# Patient Record
Sex: MALE | Race: BLACK OR AFRICAN AMERICAN | NOT HISPANIC OR LATINO | URBAN - METROPOLITAN AREA
[De-identification: names, ages, dates, MRNs, and addresses within clinical notes are randomized per-mention and may not be internally consistent; named-entity substitution may affect disease eponyms.]

---

## 2023-01-09 ENCOUNTER — EMERGENCY (EMERGENCY)
Facility: HOSPITAL | Age: 28
LOS: 0 days | Discharge: HOME | End: 2023-01-10
Attending: EMERGENCY MEDICINE
Payer: COMMERCIAL

## 2023-01-09 VITALS
TEMPERATURE: 99 F | SYSTOLIC BLOOD PRESSURE: 128 MMHG | RESPIRATION RATE: 18 BRPM | HEIGHT: 72 IN | DIASTOLIC BLOOD PRESSURE: 82 MMHG | HEART RATE: 81 BPM | WEIGHT: 285.06 LBS | OXYGEN SATURATION: 99 %

## 2023-01-09 DIAGNOSIS — S39.012A STRAIN OF MUSCLE, FASCIA AND TENDON OF LOWER BACK, INITIAL ENCOUNTER: ICD-10-CM
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
## 2023-01-09 DIAGNOSIS — M54.50 LOW BACK PAIN, UNSPECIFIED: ICD-10-CM
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
## 2023-01-09 DIAGNOSIS — Z88.8 ALLERGY STATUS TO OTHER DRUGS, MEDICAMENTS AND BIOLOGICAL SUBSTANCES STATUS: ICD-10-CM
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
## 2023-01-09 PROCEDURE — 99284 EMERGENCY DEPT VISIT MOD MDM: CPT

## 2023-01-09 NOTE — ED ADULT NURSE REASSESSMENT NOTE - NS ED NURSE REASSESS COMMENT FT1
pt reassessed, pain is still the same, "stiff back" headache. waiting to be seen by MD. vital signs taken and recorded.
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
## 2023-01-10 VITALS
HEART RATE: 63 BPM | SYSTOLIC BLOOD PRESSURE: 133 MMHG | TEMPERATURE: 99 F | RESPIRATION RATE: 17 BRPM | DIASTOLIC BLOOD PRESSURE: 83 MMHG | OXYGEN SATURATION: 99 %

## 2023-01-10 RX ORDER — DIAZEPAM 5 MG
10 TABLET ORAL ONCE
Refills: 0 | Status: DISCONTINUED | OUTPATIENT
Start: 2023-01-10 | End: 2023-01-10

## 2023-01-10 RX ORDER — IBUPROFEN 200 MG
1 TABLET ORAL
Qty: 15 | Refills: 0
Start: 2023-01-10 | End: 2023-01-14

## 2023-01-10 RX ORDER — TRAMADOL HYDROCHLORIDE 50 MG/1
50 TABLET ORAL ONCE
Refills: 0 | Status: DISCONTINUED | OUTPATIENT
Start: 2023-01-10 | End: 2023-01-10

## 2023-01-10 RX ORDER — KETOROLAC TROMETHAMINE 30 MG/ML
60 SYRINGE (ML) INJECTION ONCE
Refills: 0 | Status: DISCONTINUED | OUTPATIENT
Start: 2023-01-10 | End: 2023-01-10

## 2023-01-10 RX ORDER — TRAMADOL HYDROCHLORIDE 50 MG/1
1 TABLET ORAL
Qty: 15 | Refills: 0
Start: 2023-01-10 | End: 2023-01-14

## 2023-01-10 RX ORDER — DIAZEPAM 5 MG
1 TABLET ORAL
Qty: 12 | Refills: 0
Start: 2023-01-10 | End: 2023-01-13

## 2023-01-10 RX ADMIN — Medication 60 MILLIGRAM(S): at 02:41

## 2023-01-10 RX ADMIN — Medication 10 MILLIGRAM(S): at 02:41

## 2023-01-10 RX ADMIN — TRAMADOL HYDROCHLORIDE 50 MILLIGRAM(S): 50 TABLET ORAL at 02:40

## 2023-01-10 NOTE — ED PROVIDER NOTE - PATIENT PORTAL LINK FT
You can access the FollowMyHealth Patient Portal offered by [facility] by registering at the following website: [email or web address]. By joining [facility] [facility]’s FollowMyHealth portal, you will also be able to view your health information using other applications (apps) compatible with our system.
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
## 2023-06-19 ENCOUNTER — INPATIENT (INPATIENT)
Facility: HOSPITAL | Age: 28
LOS: 6 days | Discharge: HOME | End: 2023-06-26
Attending: INTERNAL MEDICINE | Admitting: INTERNAL MEDICINE
Payer: MEDICAID

## 2023-06-19 VITALS
SYSTOLIC BLOOD PRESSURE: 142 MMHG | HEART RATE: 151 BPM | OXYGEN SATURATION: 100 % | RESPIRATION RATE: 20 BRPM | DIASTOLIC BLOOD PRESSURE: 93 MMHG | TEMPERATURE: 98 F

## 2023-06-19 DIAGNOSIS — R21 RASH AND OTHER NONSPECIFIC SKIN ERUPTION: ICD-10-CM

## 2023-06-19 DIAGNOSIS — A60.00 HERPESVIRAL INFECTION OF UROGENITAL SYSTEM, UNSPECIFIED: ICD-10-CM

## 2023-06-19 DIAGNOSIS — N47.1 PHIMOSIS: ICD-10-CM

## 2023-06-19 DIAGNOSIS — Z29.9 ENCOUNTER FOR PROPHYLACTIC MEASURES, UNSPECIFIED: ICD-10-CM

## 2023-06-19 LAB
ALBUMIN SERPL ELPH-MCNC: 5.2 G/DL — HIGH (ref 3.3–5)
ALP SERPL-CCNC: 79 U/L — SIGNIFICANT CHANGE UP (ref 40–120)
ALT FLD-CCNC: 35 U/L — SIGNIFICANT CHANGE UP (ref 4–41)
ANION GAP SERPL CALC-SCNC: 14 MMOL/L — SIGNIFICANT CHANGE UP (ref 7–14)
APPEARANCE UR: CLEAR — SIGNIFICANT CHANGE UP
AST SERPL-CCNC: 38 U/L — SIGNIFICANT CHANGE UP (ref 4–40)
BACTERIA # UR AUTO: NEGATIVE — SIGNIFICANT CHANGE UP
BASOPHILS # BLD AUTO: 0.05 K/UL — SIGNIFICANT CHANGE UP (ref 0–0.2)
BASOPHILS NFR BLD AUTO: 0.8 % — SIGNIFICANT CHANGE UP (ref 0–2)
BILIRUB SERPL-MCNC: 0.6 MG/DL — SIGNIFICANT CHANGE UP (ref 0.2–1.2)
BILIRUB UR-MCNC: NEGATIVE — SIGNIFICANT CHANGE UP
BUN SERPL-MCNC: 7 MG/DL — SIGNIFICANT CHANGE UP (ref 7–23)
CALCIUM SERPL-MCNC: 9.8 MG/DL — SIGNIFICANT CHANGE UP (ref 8.4–10.5)
CHLORIDE SERPL-SCNC: 104 MMOL/L — SIGNIFICANT CHANGE UP (ref 98–107)
CO2 SERPL-SCNC: 22 MMOL/L — SIGNIFICANT CHANGE UP (ref 22–31)
COLOR SPEC: YELLOW — SIGNIFICANT CHANGE UP
CREAT SERPL-MCNC: 0.95 MG/DL — SIGNIFICANT CHANGE UP (ref 0.5–1.3)
DIFF PNL FLD: NEGATIVE — SIGNIFICANT CHANGE UP
EGFR: 113 ML/MIN/1.73M2 — SIGNIFICANT CHANGE UP
EOSINOPHIL # BLD AUTO: 0.15 K/UL — SIGNIFICANT CHANGE UP (ref 0–0.5)
EOSINOPHIL NFR BLD AUTO: 2.3 % — SIGNIFICANT CHANGE UP (ref 0–6)
EPI CELLS # UR: 1 /HPF — SIGNIFICANT CHANGE UP (ref 0–5)
GLUCOSE SERPL-MCNC: 106 MG/DL — HIGH (ref 70–99)
GLUCOSE UR QL: NEGATIVE — SIGNIFICANT CHANGE UP
HCT VFR BLD CALC: 49.3 % — SIGNIFICANT CHANGE UP (ref 39–50)
HGB BLD-MCNC: 16.6 G/DL — SIGNIFICANT CHANGE UP (ref 13–17)
HIV 1+2 AB+HIV1 P24 AG SERPL QL IA: SIGNIFICANT CHANGE UP
HYALINE CASTS # UR AUTO: 2 /LPF — SIGNIFICANT CHANGE UP (ref 0–7)
IANC: 3.97 K/UL — SIGNIFICANT CHANGE UP (ref 1.8–7.4)
IMM GRANULOCYTES NFR BLD AUTO: 0.3 % — SIGNIFICANT CHANGE UP (ref 0–0.9)
KETONES UR-MCNC: NEGATIVE — SIGNIFICANT CHANGE UP
LEUKOCYTE ESTERASE UR-ACNC: NEGATIVE — SIGNIFICANT CHANGE UP
LYMPHOCYTES # BLD AUTO: 1.89 K/UL — SIGNIFICANT CHANGE UP (ref 1–3.3)
LYMPHOCYTES # BLD AUTO: 29.1 % — SIGNIFICANT CHANGE UP (ref 13–44)
MCHC RBC-ENTMCNC: 31.3 PG — SIGNIFICANT CHANGE UP (ref 27–34)
MCHC RBC-ENTMCNC: 33.7 GM/DL — SIGNIFICANT CHANGE UP (ref 32–36)
MCV RBC AUTO: 92.8 FL — SIGNIFICANT CHANGE UP (ref 80–100)
MONOCYTES # BLD AUTO: 0.42 K/UL — SIGNIFICANT CHANGE UP (ref 0–0.9)
MONOCYTES NFR BLD AUTO: 6.5 % — SIGNIFICANT CHANGE UP (ref 2–14)
NEUTROPHILS # BLD AUTO: 3.97 K/UL — SIGNIFICANT CHANGE UP (ref 1.8–7.4)
NEUTROPHILS NFR BLD AUTO: 61 % — SIGNIFICANT CHANGE UP (ref 43–77)
NITRITE UR-MCNC: NEGATIVE — SIGNIFICANT CHANGE UP
NRBC # BLD: 0 /100 WBCS — SIGNIFICANT CHANGE UP (ref 0–0)
NRBC # FLD: 0 K/UL — SIGNIFICANT CHANGE UP (ref 0–0)
PH UR: 6 — SIGNIFICANT CHANGE UP (ref 5–8)
PLATELET # BLD AUTO: 286 K/UL — SIGNIFICANT CHANGE UP (ref 150–400)
POTASSIUM SERPL-MCNC: 3.8 MMOL/L — SIGNIFICANT CHANGE UP (ref 3.5–5.3)
POTASSIUM SERPL-SCNC: 3.8 MMOL/L — SIGNIFICANT CHANGE UP (ref 3.5–5.3)
PROT SERPL-MCNC: 9.1 G/DL — HIGH (ref 6–8.3)
PROT UR-MCNC: ABNORMAL
RBC # BLD: 5.31 M/UL — SIGNIFICANT CHANGE UP (ref 4.2–5.8)
RBC # FLD: 13.5 % — SIGNIFICANT CHANGE UP (ref 10.3–14.5)
RBC CASTS # UR COMP ASSIST: 3 /HPF — SIGNIFICANT CHANGE UP (ref 0–4)
SODIUM SERPL-SCNC: 140 MMOL/L — SIGNIFICANT CHANGE UP (ref 135–145)
SP GR SPEC: 1.03 — SIGNIFICANT CHANGE UP (ref 1.01–1.05)
UROBILINOGEN FLD QL: SIGNIFICANT CHANGE UP
WBC # BLD: 6.5 K/UL — SIGNIFICANT CHANGE UP (ref 3.8–10.5)
WBC # FLD AUTO: 6.5 K/UL — SIGNIFICANT CHANGE UP (ref 3.8–10.5)
WBC UR QL: 1 /HPF — SIGNIFICANT CHANGE UP (ref 0–5)

## 2023-06-19 PROCEDURE — 99285 EMERGENCY DEPT VISIT HI MDM: CPT

## 2023-06-19 PROCEDURE — 99254 IP/OBS CNSLTJ NEW/EST MOD 60: CPT

## 2023-06-19 PROCEDURE — 99223 1ST HOSP IP/OBS HIGH 75: CPT | Mod: GC

## 2023-06-19 PROCEDURE — 93010 ELECTROCARDIOGRAM REPORT: CPT

## 2023-06-19 RX ORDER — ONDANSETRON 8 MG/1
4 TABLET, FILM COATED ORAL EVERY 8 HOURS
Refills: 0 | Status: DISCONTINUED | OUTPATIENT
Start: 2023-06-19 | End: 2023-06-26

## 2023-06-19 RX ORDER — KETOROLAC TROMETHAMINE 30 MG/ML
15 SYRINGE (ML) INJECTION EVERY 6 HOURS
Refills: 0 | Status: DISCONTINUED | OUTPATIENT
Start: 2023-06-19 | End: 2023-06-20

## 2023-06-19 RX ORDER — HYDROMORPHONE HYDROCHLORIDE 2 MG/ML
1 INJECTION INTRAMUSCULAR; INTRAVENOUS; SUBCUTANEOUS ONCE
Refills: 0 | Status: DISCONTINUED | OUTPATIENT
Start: 2023-06-19 | End: 2023-06-19

## 2023-06-19 RX ORDER — HYDROMORPHONE HYDROCHLORIDE 2 MG/ML
1 INJECTION INTRAMUSCULAR; INTRAVENOUS; SUBCUTANEOUS EVERY 6 HOURS
Refills: 0 | Status: DISCONTINUED | OUTPATIENT
Start: 2023-06-19 | End: 2023-06-19

## 2023-06-19 RX ORDER — ACETAMINOPHEN 500 MG
650 TABLET ORAL EVERY 6 HOURS
Refills: 0 | Status: DISCONTINUED | OUTPATIENT
Start: 2023-06-19 | End: 2023-06-26

## 2023-06-19 RX ORDER — LANOLIN ALCOHOL/MO/W.PET/CERES
3 CREAM (GRAM) TOPICAL AT BEDTIME
Refills: 0 | Status: DISCONTINUED | OUTPATIENT
Start: 2023-06-19 | End: 2023-06-26

## 2023-06-19 RX ORDER — KETOROLAC TROMETHAMINE 30 MG/ML
30 SYRINGE (ML) INJECTION ONCE
Refills: 0 | Status: DISCONTINUED | OUTPATIENT
Start: 2023-06-19 | End: 2023-06-19

## 2023-06-19 RX ORDER — ACYCLOVIR SODIUM 500 MG
VIAL (EA) INTRAVENOUS
Refills: 0 | Status: DISCONTINUED | OUTPATIENT
Start: 2023-06-19 | End: 2023-06-21

## 2023-06-19 RX ORDER — SODIUM CHLORIDE 9 MG/ML
1000 INJECTION INTRAMUSCULAR; INTRAVENOUS; SUBCUTANEOUS ONCE
Refills: 0 | Status: COMPLETED | OUTPATIENT
Start: 2023-06-19 | End: 2023-06-19

## 2023-06-19 RX ORDER — ACYCLOVIR SODIUM 500 MG
1000 VIAL (EA) INTRAVENOUS EVERY 8 HOURS
Refills: 0 | Status: DISCONTINUED | OUTPATIENT
Start: 2023-06-20 | End: 2023-06-21

## 2023-06-19 RX ORDER — SODIUM CHLORIDE 9 MG/ML
1000 INJECTION, SOLUTION INTRAVENOUS
Refills: 0 | Status: DISCONTINUED | OUTPATIENT
Start: 2023-06-19 | End: 2023-06-20

## 2023-06-19 RX ORDER — ACYCLOVIR SODIUM 500 MG
1000 VIAL (EA) INTRAVENOUS ONCE
Refills: 0 | Status: COMPLETED | OUTPATIENT
Start: 2023-06-19 | End: 2023-06-19

## 2023-06-19 RX ORDER — KETOROLAC TROMETHAMINE 30 MG/ML
15 SYRINGE (ML) INJECTION ONCE
Refills: 0 | Status: DISCONTINUED | OUTPATIENT
Start: 2023-06-19 | End: 2023-06-19

## 2023-06-19 RX ORDER — MORPHINE SULFATE 50 MG/1
30 CAPSULE, EXTENDED RELEASE ORAL ONCE
Refills: 0 | Status: DISCONTINUED | OUTPATIENT
Start: 2023-06-19 | End: 2023-06-19

## 2023-06-19 RX ADMIN — Medication 270 MILLIGRAM(S): at 22:04

## 2023-06-19 RX ADMIN — HYDROMORPHONE HYDROCHLORIDE 1 MILLIGRAM(S): 2 INJECTION INTRAMUSCULAR; INTRAVENOUS; SUBCUTANEOUS at 19:33

## 2023-06-19 RX ADMIN — Medication 30 MILLIGRAM(S): at 22:04

## 2023-06-19 RX ADMIN — MORPHINE SULFATE 30 MILLIGRAM(S): 50 CAPSULE, EXTENDED RELEASE ORAL at 12:00

## 2023-06-19 RX ADMIN — Medication 15 MILLIGRAM(S): at 14:48

## 2023-06-19 RX ADMIN — MORPHINE SULFATE 30 MILLIGRAM(S): 50 CAPSULE, EXTENDED RELEASE ORAL at 11:30

## 2023-06-19 RX ADMIN — HYDROMORPHONE HYDROCHLORIDE 1 MILLIGRAM(S): 2 INJECTION INTRAMUSCULAR; INTRAVENOUS; SUBCUTANEOUS at 12:42

## 2023-06-19 RX ADMIN — SODIUM CHLORIDE 1000 MILLILITER(S): 9 INJECTION INTRAMUSCULAR; INTRAVENOUS; SUBCUTANEOUS at 13:27

## 2023-06-19 NOTE — ED PROVIDER NOTE - OBJECTIVE STATEMENT
27-year-old male history of hidradenitis, asthma, HSV with first episode back in March of this year who was initiated on some degree of suppressive therapy here now for stated acute exacerbation of HSV.  Is been in excruciating pain all week, symptoms not been improving he is trying Tylenol 6-8 extra strength tabs per day and has been taking his valacyclovir with supplements approximately 2 g/day.  He gets chills at night the area is exquisitely painful and he has some slight burning when he urinates.

## 2023-06-19 NOTE — ED PROVIDER NOTE - PHYSICAL EXAMINATION
Vitals: I have reviewed the patients vital signs  General: visibly uncomfortable, tearful  HEENT: Atraumatic, normocephalic, airway patent  Eyes: EOMI, tracking appropriately  Neck: no tracheal deviation, no JVD  Chest/Lungs: no trauma, symmetric chest rise, speaking in complete sentences, no WOB  Heart: marked tachy  Neuro: A+Ox3, ambulation deferred, HARO, CN grossly intact  MSK: strength at baseline in all extremities, no muscle wasting or atrophy  : chaperone Dr Jolley, has kerlex wrapped around shaft of penis that is difficult to remove due to pain but scrotum and upper groin several vesicular lesions c/w HSV, no bullae or crepitus, abd SNT, tender pelvic LAD

## 2023-06-19 NOTE — H&P ADULT - TIME BILLING
Reviewed admission imaging reports, and admission labs prior to the encounter with  the patient   Reviewed Triage and ED course/ documentation   Reviewed consultants notes, including:::    Infectious Diseases and Urology  Performed full physical exam and ROS  Obtained list of home medications and performed home medications reconciliation on EMR  Discussed prognosis, treatment and further w/up with the patient    Ordered or reviewed new admission medications and placed or reviewed all hospitalization admission orders  Managed (continued, held or adjusted doses) of home medications   Ordered  or reviewed orders of  new imaging and new lab w/up  Requested new consultations including::: Dermatology

## 2023-06-19 NOTE — H&P ADULT - PROBLEM SELECTOR PLAN 1
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   penile/groin pain for the last week or so with associated ulcerative lesions on the shaft and base of his penis. + LAD on exam, has been taking Valtrex 2g/day since lesions/pain started but no effect.   In ED, afebrile, vital signs stable and within normal limits. Labs unremarkable. HIV negative.   s/p 1 L NS, dilaudid x 2, toradol and morphine.    Unclear etiology for these lesions however given patient's history - Ddx includes     Plan:  [] urology following, appreciate recs   [] ID following, appreciate recs  [] f/u GC, chlamydia, syphilis  [] derm consult per ID   [] c/w symptomatic management/ pain control/ adequate fluid hydration while on acyclovir to avoid crystal nephropathy h/o genital HSV 2 (diagnosed in December 2022, was on suppressive Valtrex), who presented to the ED with concern of acute genital HSV flare.   penile/groin pain for the last week or so with associated ulcerative lesions on the shaft and base of his penis. + pelvic LAD on exam, has been taking Valtrex 2g/day since lesions/pain started but no effect.   In ED, afebrile, vital signs stable and within normal limits. Labs unremarkable. UA negative. HIV negative.   s/p 1 L NS, dilaudid x 2, toradol and morphine.    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   penile/groin pain for the last week or so with associated ulcerative lesions on the shaft and base of his penis. + pelvic LAD on exam, has been taking Valtrex 2g/day since lesions/pain started but no effect.   In ED, afebrile, vital signs stable and within normal limits. Labs unremarkable. UA negative. HIV negative.   s/p 1 L NS, dilaudid x 2, toradol and morphine.    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   penile/groin pain for the last week or so with associated ulcerative lesions on the shaft and base of his penis. + pelvic LAD on exam, has been taking Valtrex 2g/day since lesions/pain started but no effect.    UA negative. HIV negative.   s/p 1 L NS, Dilaudid x 2, Toradol and morphine.    High suspcion for HSV vs H ducreyi vs LGV given +LAD on exam     Plan:  [] ID following, appreciate recs- Start Empiric IV Acyclovir Q8h pending HSV/VZV swab   [] f/u GC, chlamydia, syphilis, HIV VL, urine culture   [] Dermatology consult per ID recs  [] c/w symptomatic management/ pain control/ adequate fluid hydration while on acyclovir to avoid crystal nephropathy

## 2023-06-19 NOTE — CONSULT NOTE ADULT - ASSESSMENT
Incomplete Note    Pt to be seen today  27 M with a PMH of hidradenitis suppurativa (not on treatment currently), asthma, 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     Pt with penile/groin pain for the last week or so  Associated ulcerative lesions on the shaft and base of his penis, scrotum, and throughout the groin   Has been taking Valtrex 2g/day since lesions/pain started but no effect  Last sexually active in February 2023 with men and women both.     In ED, no fevers, no leukocytosis, HIV screen negative  On exam pt with ulcerative/vesicular lesions visualized on the shaft of the penis, scrotum, and throughout the groin, + LAD on exam as well  ID consulted for recommendations    Overall;  Painful ulcerative groin lesions with associated LAD  HSV vs less likely H. Ducreyi   Less likely LGV as LAD itself is not painful     Suggest;  F/u HSV/VZV swab of lesions on penis/scrotum obtained at bedside  If + would start IV Acyclovir  Check urine Gonorrhea/Chlamydia   F/u syphilis screen  Check HIV VL (screen was negative)  Recommend Derm consult  27 M with a PMH of hidradenitis suppurativa (not on treatment currently), asthma, 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     Pt with penile/groin pain for the last week or so  Has been taking Valtrex 2g/day since lesions/pain started but no effect  Last sexually active in February 2023 with men and women both.     In ED, no fevers, no leukocytosis, HIV screen negative  On exam pt with ulcerative/vesicular lesions visualized on the shaft of the penis, scrotum, and throughout the groin, + LAD on exam as well  ID consulted for recommendations    Overall;  Painful ulcerative groin lesions with associated LAD  HSV vs less likely H. Ducreyi   Less likely LGV as LAD itself is not painful     Suggest;  F/u HSV/VZV swab of lesions on penis/scrotum obtained at bedside  If + would start IV Acyclovir  Check urine Gonorrhea/Chlamydia   F/u syphilis screen  Check HIV VL (screen was negative)  Recommend Derm consult  27 M with a PMH of hidradenitis suppurativa (not on treatment currently), asthma, 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     Pt with penile/groin pain for the last week or so  Has been taking Valtrex 2g/day since lesions/pain started but no effect  Last sexually active in February 2023 with men and women both.     In ED, no fevers, no leukocytosis, HIV screen negative  On exam pt with ulcerative/vesicular lesions visualized on the shaft of the penis, scrotum, and throughout the groin, + LAD on exam as well  ID consulted for recommendations    Overall;  Painful ulcerative groin lesions with associated LAD  HSV vs less likely H. Ducreyi   Less likely LGV as LAD itself is not painful     Suggest;  F/u HSV/VZV swab of lesions on penis/scrotum obtained at bedside  Start Acyclovir 5mg/kg IV Q8h pending above data   Check urine Gonorrhea/Chlamydia   F/u syphilis screen  Check HIV VL (screen was negative)  Recommend Derm consult  27 M with a PMH of hidradenitis suppurativa (not on treatment currently), asthma, 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     Pt with penile/groin pain for the last week or so  Has been taking Valtrex 2g/day since lesions/pain started but no effect  Last sexually active in February 2023 with men and women both.     In ED, no fevers, no leukocytosis, HIV screen negative  On exam pt with ulcerative/vesicular lesions visualized on the shaft of the penis, scrotum, and throughout the groin, + LAD on exam as well  ID consulted for recommendations    Overall;  Painful ulcerative groin lesions with associated LAD  HSV vs less likely H. Ducreyi   Less likely LGV as LAD itself is not painful     Suggest;  F/u HSV/VZV swab of lesions on penis/scrotum obtained at bedside  Start IV Acyclovir 5mg/kg Q8h pending above data   Monitor serum Cr while on IV Acyclovir   Check urine Gonorrhea/Chlamydia   F/u syphilis screen  Check HIV VL (screen was negative)  Recommend Derm consult

## 2023-06-19 NOTE — ED PROVIDER NOTE - ATTENDING CONTRIBUTION TO CARE
27-year-old male retinitis, asthma, genital HSV with initial episode in March 2023 for approximately 1 week of painful rash on his penile shaft.  Patient reports having been on Valtrex 500 mg.  Reports past several days taking approximately Valtrex 2000 milligrams daily as well as take Tylenol and Motrin for pain.  Tactile fevers when pain is severe chills at night.  He denies eye pain, chest pain, shortness of breath, abdominal pain, nausea, vomiting.  Mild dysuria with urination.  Patient last sexually active in February.  Denies scrotal or testicular pain.  Denies extremity pain or back pain.  Exam as above, mild bilateral inguinal lymphadenopathy.  No scrotal tenderness. mild Phimosis.  Patient with painful rash on penis concern for recurrent HSV infection noted tachycardia likely secondary to severe pain.  For symptom relief and reassessment.  See progress notes for further details.

## 2023-06-19 NOTE — H&P ADULT - PROBLEM SELECTOR PLAN 2
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   UA negative, HIV negative   Uncircumcised penis with foreskin unable to be retracted behind the coronal margin on exam,  ulcerative/vesicular lesions visualized on the shaft of the penis, scrotum, and throughout the groin   concomitant phimosis likely secondary to reactive process causing penile edema    Plan:   [] urology following, appreciate recs -- phimosis will likely to resolve after HSV flare resolves, patient can consider circumcision for definitive management of phimosis after recent flare subsides  [] document post void residuals to ensure not retaining  [] f/u GC, chlamydia, syphilis, HIV VL, urine culture 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   UA negative, HIV negative   Uncircumcised penis with foreskin unable to be retracted behind the coronal margin on exam,  ulcerative/vesicular lesions visualized on the shaft of the penis, scrotum, and throughout the groin   concomitant phimosis likely secondary to reactive process causing penile edema    Plan:   [] urology following, appreciate recs -- phimosis will likely to resolve after HSV flare resolves, patient can consider circumcision for definitive management of phimosis after recent flare subsides  [] document post void residuals to ensure not retaining  [] f/u GC, chlamydia, syphilis, HIV VL, urine culture  [] f/u HSV viral swab 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   UA negative, HIV negative   Uncircumcised penis with foreskin unable to be retracted behind the coronal margin on exam,  ulcerative lesions visualized on the shaft of the penis, scrotum  concomitant phimosis likely secondary to reactive process causing penile edema    Plan:   [] urology following, appreciate recs -- phimosis will likely to resolve after HSV flare resolves, patient can consider circumcision for definitive management of phimosis after recent flare subsides  [] document post void residuals to ensure not retaining  [] f/u GC, chlamydia, syphilis, HIV VL, urine culture  [] f/u HSV viral swab

## 2023-06-19 NOTE — ED PROVIDER NOTE - PROGRESS NOTE DETAILS
Je: Patient reevaluated of 30 mg of morphine IR with minimal reduction in his pain though he is now able to tolerate a careful physical exam.  After removal of his dressings the upper shaft of his penis is severely ulcerated and friable and circumferentially wraps around to the lower aspect there is no purulence crepitation or bullae.  He is uncircumcised however is unable to retract his foreskin due to the pain but is able to urinate.  Given the complexity of his wound as well as his significant pain we will place an IV for further pain relief as well as check LFTs and creatinine due to his high doses of medications that he has been taking.  I have spoken with the wound care team and requested their assistance for further management of the area.  We are requesting a urology consultation as well and they have been paged. Je: ID consulted discussed case with them, they will see patient no recommendation to change valacyclovir at this time. Shiva Guillaume PGY3: Derm attending to see pt in AM. ID recommending admission and IV acyclovir. Pt accepted for admission. Pain controlled at this time.

## 2023-06-19 NOTE — ED ADULT NURSE NOTE - OBJECTIVE STATEMENT
pt is a 27 yr old male with 1 week groin pain, worsening over time with fever, pain on urination. pt denies other medical problems. pt refusing to giver urine samples at this time. mother at bedside, pt medicated for pain. see emar for tx.

## 2023-06-19 NOTE — H&P ADULT - NSICDXPASTMEDICALHX_GEN_ALL_CORE_FT
PAST MEDICAL HISTORY:  Asthma     Hidradenitis     Recurrent genital HSV (herpes simplex virus) infection

## 2023-06-19 NOTE — ED ADULT NURSE REASSESSMENT NOTE - NS ED NURSE REASSESS COMMENT FT1
Pt received at baseline mental status, ambulatory at baseline. Pt resting in stretcher, reports pain to skin lesions on penis. Pt medicated per MAR. 20g IV placed in right AC. Pt tolerated well. Awaiting urology and infectious disease consult

## 2023-06-19 NOTE — H&P ADULT - NSHPPHYSICALEXAM_GEN_ALL_CORE
VITALS:   T(C): 36.9 (06-19-23 @ 11:47), Max: 36.9 (06-19-23 @ 09:49)  HR: 100 (06-19-23 @ 11:47) (100 - 151)  BP: 131/90 (06-19-23 @ 11:47) (131/90 - 142/93)  RR: 20 (06-19-23 @ 11:47) (20 - 20)  SpO2: 100% (06-19-23 @ 11:47) (100% - 100%)    GENERAL: NAD, lying in bed comfortably  HEAD:  Atraumatic, Normocephalic  EYES: EOMI, PERRLA, conjunctiva and sclera clear  ENT: Moist mucous membranes  NECK: Supple, No JVD  CHEST/LUNG: Clear to auscultation bilaterally; No rales, rhonchi, wheezing, or rubs. Unlabored respirations  HEART: Regular rate and rhythm; No murmurs, rubs, or gallops  ABDOMEN: BSx4; Soft, nontender, nondistended  EXTREMITIES:  2+ Peripheral Pulses, brisk capillary refill. No clubbing, cyanosis, or edema  NERVOUS SYSTEM:  A&Ox3, no focal deficits   SKIN:  ulcerative/vesicular lesions visualized on the shaft of the penis, on the scrotum, and throughout the groin  psych: normal behavior, normal affect VITALS:   T(C): 36.9 (06-19-23 @ 11:47), Max: 36.9 (06-19-23 @ 09:49)  HR: 100 (06-19-23 @ 11:47) (100 - 151)  BP: 131/90 (06-19-23 @ 11:47) (131/90 - 142/93)  RR: 20 (06-19-23 @ 11:47) (20 - 20)  SpO2: 100% (06-19-23 @ 11:47) (100% - 100%)    GENERAL: NAD, lying in bed comfortably  HEAD:  Atraumatic, Normocephalic  EYES: EOMI, PERRLA, conjunctiva and sclera clear  ENT: Moist mucous membranes  NECK: Supple  CHEST/LUNG: Clear to auscultation bilaterally; No rales, rhonchi, wheezing, or rubs. Unlabored respirations  HEART: Regular rate and rhythm; No murmurs, rubs, or gallops  ABDOMEN: BSx4; Soft, nondistended, bilateral lower quadrant and pubic tenderness   EXTREMITIES:  2+ Peripheral Pulses, brisk capillary refill. No clubbing, cyanosis, or edema  NERVOUS SYSTEM:  A&Ox3, no focal deficits   SKIN:  ulcerative lesions visualized on the shaft of the penis, on the scrotum, and throughout the groin  psych: normal behavior, normal affect Vital Signs Last 24 Hrs  T(C): 36.9 (19 Jun 2023 11:47), Max: 36.9 (19 Jun 2023 09:49)  T(F): 98.4 (19 Jun 2023 11:47), Max: 98.4 (19 Jun 2023 09:49)  HR: 100 (19 Jun 2023 11:47) (100 - 151)  BP: 131/90 (19 Jun 2023 11:47) (131/90 - 142/93)  BP(mean): --  RR: 20 (19 Jun 2023 11:47) (20 - 20)  SpO2: 100% (19 Jun 2023 11:47) (100% - 100%)    Parameters below as of 19 Jun 2023 11:47  Patient On (Oxygen Delivery Method): room air    GENERAL: NAD, lying in bed comfortably  HEAD:  Atraumatic, Normocephalic  EYES: EOMI, PERRLA, conjunctiva and sclera clear  ENT: Moist mucous membranes  NECK: Supple  CHEST/LUNG: Clear to auscultation bilaterally; No rales, rhonchi, wheezing, or rubs. Unlabored respirations  HEART: Regular rate and rhythm; No murmurs, rubs, or gallops  ABDOMEN: BSx4; Soft, nondistended, bilateral lower quadrant and pubic tenderness   EXTREMITIES:  2+ Peripheral Pulses, brisk capillary refill. No clubbing, cyanosis, or edema  NERVOUS SYSTEM:  A&Ox3, no focal deficits   SKIN:  extensive ulcerative lesions visualized on the shaft of the penis, on the scrotum, and throughout the groin  psych: normal behavior, normal affect

## 2023-06-19 NOTE — ED PROVIDER NOTE - NSFOLLOWUPINSTRUCTIONS_ED_ALL_ED_FT
Follow up with the infectious disease clinic as soon as possible    You can take TYLENOL/ACETAMINOPHEN up to 4,000mg a day for your symptoms in four divided doses.     You can take MOTRIN/IBUPROFEN up to 2,400mg a day in four divided doses (for up to 5 days with food).     Continue with your VALACYCLOVIR/VALTREX 1 gram daily    You can take MORPHINE 30mg every 6 hours as needed for severe pain     Return to the ER for uncontrolled pain, inability to urinate, new fevers, pus coming from the affected area, passing out, new abdominal pain or for any concern you would like evaluated.

## 2023-06-19 NOTE — ED PROVIDER NOTE - CLINICAL SUMMARY MEDICAL DECISION MAKING FREE TEXT BOX
27-year-old male history of hidradenitis, asthma, HSV on suppressive therapy coming in now for presumed acute flare of HSV.  On exam the patient has wrapped gauze around his penile shaft because of how excruciating the pain is from skin on skin contact there are vesicular lesions visualized throughout the groin area and on the scrotum without bullae or crepitation.  His abdomen is soft nontender, he does have some tender pelvic lymphadenopathy.  Likely HSV related, needs stronger pain control and continued antiviral treatment.  At this point warrants further ID evaluation Will need rapid referral to their clinic.  Will screen for other sexually transmitted illnesses, urinary infection, and treat as indicated.  Was not markedly tachycardic in triage however this appears to be due to acute pain as he is moaning and crying in pain and having difficulty speaking due to it.  Will reassess vitals after pain more appropriately.

## 2023-06-19 NOTE — ED ADULT TRIAGE NOTE - CHIEF COMPLAINT QUOTE
Pt arrives ambulatory to triage c/o skin lesions and pain to groin x1 wk. Endorses mild dysuria. Denies CP or SOB. Took 4 tylenol PTA. PMHx: asthma.

## 2023-06-19 NOTE — H&P ADULT - ATTENDING COMMENTS
26yo Male with MHx of hidradenitis suppurativa (not on treatment currently), asthma, genital HSV 2 suppressive Valtrex a/w penile edema and extensive genital rash due to likely severe HSV flare.     Assessment and plan supplemented and modified where indicated;

## 2023-06-19 NOTE — H&P ADULT - ASSESSMENT
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  27 M with a PMH of hidradenitis suppurativa (not on treatment currently), asthma, genital HSV 2 suppressive Valtrex, who presented to the ED with concern of acute genital HSV flare.  26yo Male with MHx of hidradenitis suppurativa (not on treatment currently), asthma, genital HSV 2 suppressive Valtrex a/w penile edema and extensive genital rash due to likely severe HSV flare.

## 2023-06-19 NOTE — H&P ADULT - HISTORY OF PRESENT ILLNESS
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     Pt with penile/groin pain for the last week or so with associated ulcerative lesions on the shaft and base of his penis. Has been taking Valtrex 2g/day since lesions/pain started but no effect. Pt states he was last sexually active in February 2023 with men and women both.     In ED, afebrile, vital signs stable and within normal limits. Labs unremarkable. On exam pt with ulcerative/vesicular lesions visualized on the shaft of the penis, on the scrotum, and throughout the groin, + LAD on exam as well. ID consulted for recommendations.    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     Pt with penile/groin pain for the last week or so with associated ulcerative lesions on the shaft and base of his penis. Has been taking Valtrex 2g/day since lesions/pain started but no effect. Pt states he was last sexually active in February 2023 with men and women both.     In ED, afebrile, vital signs stable and within normal limits. Labs unremarkable. Received 1 L NS, dilaudid x 2, toradol and morphine.     27 M with a PMH of hidradenitis suppurativa (not on treatment currently), asthma, genital HSV 2 was on suppressive Valtrex, who presented to the ED with concern of acute genital HSV flare.     Pt diagnosed with genital HSV 12/2022. Has had recurrent herpes flares in the outpatient setting treated with valcyte but is presenting to the hospital after worsening pain/flare despire taking valycte. Pt with penile/groin pain for the last week or so with associated ulcerative lesions on the shaft and base of his penis. Has been taking Valtrex 2g/day since lesions/pain + tylenol without relief. Reports chills at night and burning while urinating.  Last sexually active in February 2023 with men and women both.     In ED, afebrile, vital signs stable and within normal limits. Labs unremarkable. Received 1 L NS, dilaudid x 2, toradol and morphine.     27 M with a PMH of hidradenitis suppurativa (not on treatment currently), asthma, genital HSV 2 was on suppressive Valtrex, who presented to the ED with concern of acute genital HSV flare.     Pt diagnosed with genital HSV 12/2022. Has had recurrent herpes flares in the outpatient setting treated with valtrex but is presenting to the hospital after worsening pain/flare despire taking valtrex. Pt with penile/groin pain for the last week with associated ulcerative lesions on the shaft and base of his penis. Has been taking Valtrex 2g/day since lesions/pain + tylenol without relief. Reports chills at night and intermittent burning while urinating. Reports R and L LQ tenderness. Denies penile discharge. Last sexually active in February 2023 with men and women both.     In ED, afebrile, vital signs stable and within normal limits. Labs unremarkable. Received 1 L NS, dilaudid x 2, toradol and morphine.     27 M with a PMH of hidradenitis suppurativa (not on treatment currently), asthma, genital HSV 2 was on suppressive Valtrex, who presented to the ED with concern of acute genital HSV flare.     Pt diagnosed with genital HSV 12/2022. Has had recurrent herpes flares in the outpatient setting treated with valtrex but is presenting to the hospital after worsening pain/flare despire taking valtrex. Pt with penile/groin pain for the last week with associated ulcerative lesions on the shaft and base of his penis. Has been taking Valtrex 2g/day since lesions/pain + tylenol without relief. Reports chills at night and intermittent burning while urinating. Reports R and L LQ tenderness. Denies penile discharge. Last sexually active in February 2023 with men and women both.     In ED, afebrile, tachycardic, rest of vital signs stable and within normal limits. Labs unremarkable. Received 1 L NS, dilaudid x 2, toradol and morphine.     26yo Male with MHx of hidradenitis suppurativa (not on treatment currently), asthma, genital HSV 2 was on suppressive Valtrex, who presented to the ED with concern of acute genital HSV flare.   Pt diagnosed with genital HSV 12/2022. Has had recurrent herpes flares in the outpatient setting treated with valtrex but is presenting to the hospital after worsening pain/flare despire taking valtrex. Pt with penile/groin pain for the last week with associated ulcerative lesions on the shaft and base of his penis. Has been taking Valtrex 2g/day since lesions/pain + tylenol without relief. Reports chills at night and intermittent burning while urinating. Reports R and L LQ tenderness. Denies penile discharge. Last sexually active in February 2023 with men and women both.     ED course: afebrile, tachycardic. Received 1 L NS, Dilaudid x 2, Toradol and Morphine.

## 2023-06-19 NOTE — CONSULT NOTE ADULT - SUBJECTIVE AND OBJECTIVE BOX
HPI  27-year-old male history of hidradenitis, asthma, HSV with first episode back in March of this year who was initiated on some degree of suppressive therapy here now for stated acute exacerbation of HSV.  Is been in excruciating pain all week, symptoms not been improving he is trying Tylenol 6-8 extra strength tabs per day and has been taking his valacyclovir with supplements approximately 2 g/day.  He gets chills at night the area is exquisitely painful and he has some slight burning when he urinates.    Urology called to evaluate patients phimosis. Patient states that he has some difficulty urinating, endorses some dysuria, and penile edema. He states that since his most recent flare of HSV he has been unable to retract his foreskin. He denies any frequency, urgency, weak stream, and hematuria. Patient denies any prior history of phimosis. He endorses painful erections and areas consistent with HSV flares are exquisitely tender.     PAST MEDICAL & SURGICAL HISTORY:      MEDICATIONS  (STANDING):  sodium chloride 0.9% Bolus 1000 milliLiter(s) IV Bolus once    MEDICATIONS  (PRN):      FAMILY HISTORY:      Allergies    predniSONE (Swelling)    Intolerances        SOCIAL HISTORY:    REVIEW OF SYSTEMS: Otherwise negative as stated in HPI    Physical Exam  Vital signs  T(C): 36.9 (06-19-23 @ 11:47), Max: 36.9 (06-19-23 @ 09:49)  HR: 100 (06-19-23 @ 11:47)  BP: 131/90 (06-19-23 @ 11:47)  SpO2: 100% (06-19-23 @ 11:47)  Wt(kg): --    Output      Gen:  NAD    Pulm:  No respiratory distress  	  CV:  RRR    GI:  S/ND/NT    :      MSK:      LABS:        Urine Cx: pending collection  Blood Cx:    RADIOLOGY:     HPI  27-year-old male history of hidradenitis, asthma, HSV with first episode back in March of this year who was initiated on some degree of suppressive therapy here now for stated acute exacerbation of HSV.  Is been in excruciating pain all week, symptoms not been improving he is trying Tylenol 6-8 extra strength tabs per day and has been taking his valacyclovir with supplements approximately 2 g/day.  He gets chills at night the area is exquisitely painful and he has some slight burning when he urinates.    Patient states that he was diagnosed with herpes in december after having unprotected intercourse. He has had herpes flares in the outpatient setting treated with valcyte but is presenting to the hospital after worsening pain/flare despire taking valycte. Urology called to evaluate patients phimosis. Patient states that he has some difficulty urinating, endorses some dysuria, incomplete emptying and penile edema. He states that since his most recent flare of HSV he has been unable to retract his foreskin. He denies any frequency, urgency, weak stream, and hematuria. Patient denies any prior history of phimosis. He endorses painful erections and areas consistent with HSV flares are exquisitely tender. Of note patient has a history of hidradenitis suppurativa     PAST MEDICAL & SURGICAL HISTORY:      MEDICATIONS  (STANDING):  sodium chloride 0.9% Bolus 1000 milliLiter(s) IV Bolus once    MEDICATIONS  (PRN):      FAMILY HISTORY:      Allergies    predniSONE (Swelling)    Intolerances        SOCIAL HISTORY:    REVIEW OF SYSTEMS: Otherwise negative as stated in HPI    Physical Exam  Vital signs  T(C): 36.9 (06-19-23 @ 11:47), Max: 36.9 (06-19-23 @ 09:49)  HR: 100 (06-19-23 @ 11:47)  BP: 131/90 (06-19-23 @ 11:47)  SpO2: 100% (06-19-23 @ 11:47)  Wt(kg): --    Output      Gen:  NAD    Pulm:  No respiratory distress  	  CV:  RRR    GI:  S/ND/NT    :  Uncircumcised penis with foreskin unable to be retracted behind the coronal margin. Ulcerative/vesicular lesions visualized on the shaft of the penis, scrotum, and throughout the groin.     MSK:      LABS:        Urine Cx: pending collection  Blood Cx:    RADIOLOGY:

## 2023-06-19 NOTE — H&P ADULT - GENITOURINARY COMMENTS
extensive ulcerative lesions visualized on the shaft of the penis, on the scrotum, and throughout the groin, (+) penile edema

## 2023-06-19 NOTE — CONSULT NOTE ADULT - ASSESSMENT
Patient is a 28yo M presenting with an HSV flare found to have concomitant phimosis likely secondary to reactive process causing penile edema    Recommendations  - Document post void residual to ensure that patient is not in retention  - Send UA/Ucx  - G/C NAAT -> if positive would treat appropriately  - HSV swab to confirm diagnosis -> treat appropriately with antivirals if positive  - FINAL PLAN PENDING DISCUSSION with Dr. Layton Patient is a 28yo M presenting with an HSV flare found to have concomitant phimosis likely secondary to reactive process causing penile edema    Recommendations  - Document post void residual to ensure that patient is not in retention  - Send UA/Ucx  - G/C NAAT -> if positive would treat appropriately  - HSV swab to confirm diagnosis -> treatment per ID  - Check for HIV  - Phimosis likely to resolve after HSV flare resolves, patient can consider circumcision for definitive management of phimosis after recent flare subsides  - Discussed with Dr. greer

## 2023-06-19 NOTE — H&P ADULT - NSHPLABSRESULTS_GEN_ALL_CORE
.    -Personally interpreted EKG: Sinus tach 115bpm, qtc 428, no acute Tw or ST changes, no PACs or PVCs     -Personally reviewed labs below:                        16.6   6.50  )-----------( 286      ( 19 Jun 2023 12:30 )             49.3   06-19    140  |  104  |  7   ----------------------------<  106<H>  3.8   |  22  |  0.95    Ca    9.8      19 Jun 2023 12:30    TPro  9.1<H>  /  Alb  5.2<H>  /  TBili  0.6  /  DBili  x   /  AST  38  /  ALT  35  /  AlkPhos  79  06-19

## 2023-06-19 NOTE — H&P ADULT - PROBLEM SELECTOR PLAN 3
DVT prophylaxis: lovenox subcu   diet: regular  dispo: pending clinical course DVT prophylaxis: Lovenox sq  diet: regular  dispo: pending clinical course

## 2023-06-19 NOTE — CONSULT NOTE ADULT - SUBJECTIVE AND OBJECTIVE BOX
Patient is a 26 yo Male who presents with a chief complaint of penile lesion(s) and pain     HPI:    REVIEW OF SYSTEMS      prior hospital charts reviewed [V]  primary team notes reviewed [V]  other consultant notes reviewed [V]    PAST MEDICAL & SURGICAL HISTORY:    SOCIAL HISTORY:  - Denied smoking/vaping/alcohol/recreational drug use    FAMILY HISTORY:    Allergies  predniSONE (Swelling)    ANTIMICROBIALS:    ANTIMICROBIALS (past 90 days):  MEDICATIONS  (STANDING):    OTHER MEDS:   MEDICATIONS  (STANDING):    VITALS:  Vital Signs Last 24 Hrs  T(F): 98.4 (06-19-23 @ 11:47), Max: 98.4 (06-19-23 @ 09:49)    Vital Signs Last 24 Hrs  HR: 100 (06-19-23 @ 11:47) (100 - 151)  BP: 131/90 (06-19-23 @ 11:47) (131/90 - 142/93)  RR: 20 (06-19-23 @ 11:47)  SpO2: 100% (06-19-23 @ 11:47) (100% - 100%)  Wt(kg): --    EXAM:      Labs:                        16.6   6.50  )-----------( 286      ( 19 Jun 2023 12:30 )             49.3     06-19    140  |  104  |  7   ----------------------------<  106<H>  3.8   |  22  |  0.95    Ca    9.8      19 Jun 2023 12:30    TPro  9.1<H>  /  Alb  5.2<H>  /  TBili  0.6  /  DBili  x   /  AST  38  /  ALT  35  /  AlkPhos  79  06-19    WBC Trend:  WBC Count: 6.50 (06-19-23 @ 12:30)    Auto Neutrophil #: 3.97 K/uL (06-19-23 @ 12:30)    Creatine Trend:  Creatinine, Serum: 0.95 (06-19)    Liver Biochemical Testing Trend:  Alanine Aminotransferase (ALT/SGPT): 35 (06-19)  Aspartate Aminotransferase (AST/SGOT): 38 (06-19-23 @ 12:30)  Bilirubin Total, Serum: 0.6 (06-19)    Auto Eosinophil %: 2.3 % (06-19-23 @ 12:30)    MICROBIOLOGY:    HIV-1/2 Combo Result: Nonreact (06-19-23 @ 12:30)    RADIOLOGY:  imaging below personally reviewed Patient is a 28 yo Male who presents with a chief complaint of penile lesion(s) and pain     HPI:  27 M with a PMH of hidradenitis suppurativa (not on treatment currently), asthma, 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     Pt with penile/groin pain for the last week or so with associated ulcerative lesions on the shaft and base of his penis. Has been taking Valtrex 2g/day since lesions/pain started but no effect. Pt states he was last sexually active in February 2023 with men and women both.     In ED, no fevers, no leukocytosis, HIV screen negative. On exam pt with ulcerative/vesicular lesions visualized on the shaft of the penis, on the scrotum, and throughout the groin, + LAD on exam as well. ID consulted for recommendations.     REVIEW OF SYSTEMS  Constitutional: No fevers  Skin: No phlebitis	  Eyes: No discharge	  ENMT: No sore throat  Respiratory: No cough, no SOB  Cardiovascular:  No chest pain  Gastrointestinal: No pain, nausea, vomiting	  Genitourinary: No discharge or flank pain  + Penile and Groin lesions, painful   MSK: No arthralgias  Neurological: No HA, no AMS     prior hospital charts reviewed [V]  primary team notes reviewed [V]  other consultant notes reviewed [V]    PAST MEDICAL & SURGICAL HISTORY:    SOCIAL HISTORY:  - Denied smoking/vaping/alcohol/recreational drug use    FAMILY HISTORY:    Allergies  predniSONE (Swelling)    ANTIMICROBIALS:    ANTIMICROBIALS (past 90 days):  MEDICATIONS  (STANDING):    OTHER MEDS:   MEDICATIONS  (STANDING):    VITALS:  Vital Signs Last 24 Hrs  T(F): 98.4 (06-19-23 @ 11:47), Max: 98.4 (06-19-23 @ 09:49)    Vital Signs Last 24 Hrs  HR: 100 (06-19-23 @ 11:47) (100 - 151)  BP: 131/90 (06-19-23 @ 11:47) (131/90 - 142/93)  RR: 20 (06-19-23 @ 11:47)  SpO2: 100% (06-19-23 @ 11:47) (100% - 100%)  Wt(kg): --    EXAM:  General: Patient in NAD  HEENT: NCAT, EOMI  CV: S1+S2  Lungs: No respiratory distress, CTAB  Abd: Soft, nontender, no guarding  : Ulcerative/vesicular lesions visualized on the shaft of the penis, scrotum, and throughout the groin, + LAD on exam as well  Neuro: Alert and oriented to time, place and person  Ext: No cyanosis  Skin: No phlebitis     Labs:                        16.6   6.50  )-----------( 286      ( 19 Jun 2023 12:30 )             49.3     06-19    140  |  104  |  7   ----------------------------<  106<H>  3.8   |  22  |  0.95    Ca    9.8      19 Jun 2023 12:30    TPro  9.1<H>  /  Alb  5.2<H>  /  TBili  0.6  /  DBili  x   /  AST  38  /  ALT  35  /  AlkPhos  79  06-19    WBC Trend:  WBC Count: 6.50 (06-19-23 @ 12:30)    Auto Neutrophil #: 3.97 K/uL (06-19-23 @ 12:30)    Creatine Trend:  Creatinine, Serum: 0.95 (06-19)    Liver Biochemical Testing Trend:  Alanine Aminotransferase (ALT/SGPT): 35 (06-19)  Aspartate Aminotransferase (AST/SGOT): 38 (06-19-23 @ 12:30)  Bilirubin Total, Serum: 0.6 (06-19)    Auto Eosinophil %: 2.3 % (06-19-23 @ 12:30)    MICROBIOLOGY:    HIV-1/2 Combo Result: Nonreact (06-19-23 @ 12:30)    RADIOLOGY:  imaging below personally reviewed

## 2023-06-19 NOTE — ED ADULT NURSE NOTE - NSFALLUNIVINTERV_ED_ALL_ED
Bed/Stretcher in lowest position, wheels locked, appropriate side rails in place/Call bell, personal items and telephone in reach/Instruct patient to call for assistance before getting out of bed/chair/stretcher/Non-slip footwear applied when patient is off stretcher/[place] to call system/Physically safe environment - no spills, clutter or unnecessary equipment/Purposeful proactive rounding/Room/bathroom lighting operational, light cord in reach

## 2023-06-20 LAB
A1C WITH ESTIMATED AVERAGE GLUCOSE RESULT: 4.9 % — SIGNIFICANT CHANGE UP (ref 4–5.6)
ALBUMIN SERPL ELPH-MCNC: 4.4 G/DL — SIGNIFICANT CHANGE UP (ref 3.3–5)
ALP SERPL-CCNC: 67 U/L — SIGNIFICANT CHANGE UP (ref 40–120)
ALT FLD-CCNC: 28 U/L — SIGNIFICANT CHANGE UP (ref 4–41)
ANION GAP SERPL CALC-SCNC: 15 MMOL/L — HIGH (ref 7–14)
AST SERPL-CCNC: 35 U/L — SIGNIFICANT CHANGE UP (ref 4–40)
BILIRUB SERPL-MCNC: 0.5 MG/DL — SIGNIFICANT CHANGE UP (ref 0.2–1.2)
BUN SERPL-MCNC: 10 MG/DL — SIGNIFICANT CHANGE UP (ref 7–23)
C TRACH RRNA SPEC QL NAA+PROBE: SIGNIFICANT CHANGE UP
CALCIUM SERPL-MCNC: 8.8 MG/DL — SIGNIFICANT CHANGE UP (ref 8.4–10.5)
CHLORIDE SERPL-SCNC: 105 MMOL/L — SIGNIFICANT CHANGE UP (ref 98–107)
CO2 SERPL-SCNC: 19 MMOL/L — LOW (ref 22–31)
CREAT SERPL-MCNC: 0.96 MG/DL — SIGNIFICANT CHANGE UP (ref 0.5–1.3)
EGFR: 112 ML/MIN/1.73M2 — SIGNIFICANT CHANGE UP
ESTIMATED AVERAGE GLUCOSE: 94 — SIGNIFICANT CHANGE UP
GLUCOSE SERPL-MCNC: 105 MG/DL — HIGH (ref 70–99)
HAV IGM SER-ACNC: SIGNIFICANT CHANGE UP
HBV CORE IGM SER-ACNC: SIGNIFICANT CHANGE UP
HBV SURFACE AG SER-ACNC: SIGNIFICANT CHANGE UP
HCT VFR BLD CALC: 42.5 % — SIGNIFICANT CHANGE UP (ref 39–50)
HCV AB S/CO SERPL IA: 0.11 S/CO — SIGNIFICANT CHANGE UP (ref 0–0.99)
HCV AB SERPL-IMP: SIGNIFICANT CHANGE UP
HGB BLD-MCNC: 14.2 G/DL — SIGNIFICANT CHANGE UP (ref 13–17)
HSV+VZV DNA SPEC QL NAA+PROBE: ABNORMAL
HSV+VZV DNA SPEC QL NAA+PROBE: ABNORMAL
MCHC RBC-ENTMCNC: 31.5 PG — SIGNIFICANT CHANGE UP (ref 27–34)
MCHC RBC-ENTMCNC: 33.4 GM/DL — SIGNIFICANT CHANGE UP (ref 32–36)
MCV RBC AUTO: 94.2 FL — SIGNIFICANT CHANGE UP (ref 80–100)
N GONORRHOEA RRNA SPEC QL NAA+PROBE: SIGNIFICANT CHANGE UP
NRBC # BLD: 0 /100 WBCS — SIGNIFICANT CHANGE UP (ref 0–0)
NRBC # FLD: 0 K/UL — SIGNIFICANT CHANGE UP (ref 0–0)
PLATELET # BLD AUTO: 230 K/UL — SIGNIFICANT CHANGE UP (ref 150–400)
POTASSIUM SERPL-MCNC: 3.5 MMOL/L — SIGNIFICANT CHANGE UP (ref 3.5–5.3)
POTASSIUM SERPL-SCNC: 3.5 MMOL/L — SIGNIFICANT CHANGE UP (ref 3.5–5.3)
PROT SERPL-MCNC: 7.9 G/DL — SIGNIFICANT CHANGE UP (ref 6–8.3)
RBC # BLD: 4.51 M/UL — SIGNIFICANT CHANGE UP (ref 4.2–5.8)
RBC # FLD: 14 % — SIGNIFICANT CHANGE UP (ref 10.3–14.5)
SODIUM SERPL-SCNC: 139 MMOL/L — SIGNIFICANT CHANGE UP (ref 135–145)
SPECIMEN SOURCE: SIGNIFICANT CHANGE UP
T PALLIDUM AB TITR SER: NEGATIVE — SIGNIFICANT CHANGE UP
WBC # BLD: 9.53 K/UL — SIGNIFICANT CHANGE UP (ref 3.8–10.5)
WBC # FLD AUTO: 9.53 K/UL — SIGNIFICANT CHANGE UP (ref 3.8–10.5)

## 2023-06-20 PROCEDURE — 99223 1ST HOSP IP/OBS HIGH 75: CPT

## 2023-06-20 PROCEDURE — 99233 SBSQ HOSP IP/OBS HIGH 50: CPT

## 2023-06-20 PROCEDURE — 99232 SBSQ HOSP IP/OBS MODERATE 35: CPT

## 2023-06-20 RX ORDER — SODIUM CHLORIDE 9 MG/ML
1000 INJECTION INTRAMUSCULAR; INTRAVENOUS; SUBCUTANEOUS
Refills: 0 | Status: DISCONTINUED | OUTPATIENT
Start: 2023-06-20 | End: 2023-06-20

## 2023-06-20 RX ORDER — HYDROMORPHONE HYDROCHLORIDE 2 MG/ML
1 INJECTION INTRAMUSCULAR; INTRAVENOUS; SUBCUTANEOUS EVERY 4 HOURS
Refills: 0 | Status: DISCONTINUED | OUTPATIENT
Start: 2023-06-20 | End: 2023-06-21

## 2023-06-20 RX ORDER — SODIUM CHLORIDE 9 MG/ML
1000 INJECTION, SOLUTION INTRAVENOUS
Refills: 0 | Status: DISCONTINUED | OUTPATIENT
Start: 2023-06-20 | End: 2023-06-20

## 2023-06-20 RX ORDER — HYDROMORPHONE HYDROCHLORIDE 2 MG/ML
1 INJECTION INTRAMUSCULAR; INTRAVENOUS; SUBCUTANEOUS EVERY 6 HOURS
Refills: 0 | Status: DISCONTINUED | OUTPATIENT
Start: 2023-06-20 | End: 2023-06-20

## 2023-06-20 RX ORDER — SODIUM CHLORIDE 9 MG/ML
1000 INJECTION INTRAMUSCULAR; INTRAVENOUS; SUBCUTANEOUS
Refills: 0 | Status: DISCONTINUED | OUTPATIENT
Start: 2023-06-20 | End: 2023-06-21

## 2023-06-20 RX ORDER — ENOXAPARIN SODIUM 100 MG/ML
40 INJECTION SUBCUTANEOUS EVERY 24 HOURS
Refills: 0 | Status: DISCONTINUED | OUTPATIENT
Start: 2023-06-20 | End: 2023-06-26

## 2023-06-20 RX ORDER — KETOROLAC TROMETHAMINE 30 MG/ML
15 SYRINGE (ML) INJECTION EVERY 6 HOURS
Refills: 0 | Status: DISCONTINUED | OUTPATIENT
Start: 2023-06-20 | End: 2023-06-20

## 2023-06-20 RX ADMIN — Medication 15 MILLIGRAM(S): at 04:13

## 2023-06-20 RX ADMIN — Medication 270 MILLIGRAM(S): at 06:49

## 2023-06-20 RX ADMIN — Medication 15 MILLIGRAM(S): at 03:39

## 2023-06-20 RX ADMIN — Medication 50 MILLIGRAM(S): at 15:08

## 2023-06-20 RX ADMIN — ENOXAPARIN SODIUM 40 MILLIGRAM(S): 100 INJECTION SUBCUTANEOUS at 19:58

## 2023-06-20 RX ADMIN — HYDROMORPHONE HYDROCHLORIDE 1 MILLIGRAM(S): 2 INJECTION INTRAMUSCULAR; INTRAVENOUS; SUBCUTANEOUS at 03:39

## 2023-06-20 RX ADMIN — SODIUM CHLORIDE 75 MILLILITER(S): 9 INJECTION INTRAMUSCULAR; INTRAVENOUS; SUBCUTANEOUS at 15:13

## 2023-06-20 RX ADMIN — ONDANSETRON 4 MILLIGRAM(S): 8 TABLET, FILM COATED ORAL at 22:55

## 2023-06-20 RX ADMIN — Medication 30 MILLILITER(S): at 20:02

## 2023-06-20 RX ADMIN — HYDROMORPHONE HYDROCHLORIDE 1 MILLIGRAM(S): 2 INJECTION INTRAMUSCULAR; INTRAVENOUS; SUBCUTANEOUS at 15:37

## 2023-06-20 RX ADMIN — Medication 650 MILLIGRAM(S): at 04:03

## 2023-06-20 RX ADMIN — Medication 30 MILLIGRAM(S): at 03:39

## 2023-06-20 RX ADMIN — Medication 15 MILLIGRAM(S): at 01:52

## 2023-06-20 RX ADMIN — HYDROMORPHONE HYDROCHLORIDE 1 MILLIGRAM(S): 2 INJECTION INTRAMUSCULAR; INTRAVENOUS; SUBCUTANEOUS at 19:57

## 2023-06-20 RX ADMIN — HYDROMORPHONE HYDROCHLORIDE 1 MILLIGRAM(S): 2 INJECTION INTRAMUSCULAR; INTRAVENOUS; SUBCUTANEOUS at 10:33

## 2023-06-20 RX ADMIN — HYDROMORPHONE HYDROCHLORIDE 1 MILLIGRAM(S): 2 INJECTION INTRAMUSCULAR; INTRAVENOUS; SUBCUTANEOUS at 04:04

## 2023-06-20 RX ADMIN — HYDROMORPHONE HYDROCHLORIDE 1 MILLIGRAM(S): 2 INJECTION INTRAMUSCULAR; INTRAVENOUS; SUBCUTANEOUS at 04:13

## 2023-06-20 RX ADMIN — SODIUM CHLORIDE 75 MILLILITER(S): 9 INJECTION INTRAMUSCULAR; INTRAVENOUS; SUBCUTANEOUS at 04:04

## 2023-06-20 RX ADMIN — HYDROMORPHONE HYDROCHLORIDE 1 MILLIGRAM(S): 2 INJECTION INTRAMUSCULAR; INTRAVENOUS; SUBCUTANEOUS at 15:07

## 2023-06-20 RX ADMIN — Medication 30 MILLILITER(S): at 04:04

## 2023-06-20 RX ADMIN — HYDROMORPHONE HYDROCHLORIDE 1 MILLIGRAM(S): 2 INJECTION INTRAMUSCULAR; INTRAVENOUS; SUBCUTANEOUS at 20:57

## 2023-06-20 RX ADMIN — ONDANSETRON 4 MILLIGRAM(S): 8 TABLET, FILM COATED ORAL at 04:04

## 2023-06-20 RX ADMIN — Medication 270 MILLIGRAM(S): at 21:30

## 2023-06-20 RX ADMIN — Medication 650 MILLIGRAM(S): at 04:13

## 2023-06-20 NOTE — PROGRESS NOTE ADULT - SUBJECTIVE AND OBJECTIVE BOX
Follow Up:  penile lesions    Interval History/ROS:  Overnight: No acute event no acute events.  Patient remains afebrile.  Otherwise hemodynamically stable.  Latest labs show no leukocytosis, BMP with renal function within normal limits.  Syphilis screen negative, hepatitis A, B, C serologies nonreactive.  Chlamydia/GC/GC negative, HIV negative.    Patient seen examined at bedside.  No new complaints.    Allergies  predniSONE (Swelling)    ANTIMICROBIALS:  acyclovir IVPB    acyclovir IVPB 1000 every 8 hours      OTHER MEDS:  MEDICATIONS  (STANDING):  acetaminophen     Tablet .. 650 every 6 hours PRN  aluminum hydroxide/magnesium hydroxide/simethicone Suspension 30 every 4 hours PRN  enoxaparin Injectable 40 every 24 hours  HYDROmorphone  Injectable 1 every 4 hours PRN  melatonin 3 at bedtime PRN  ondansetron Injectable 4 every 8 hours PRN      Vital Signs Last 24 Hrs  T(C): 36.7 (2023 11:01), Max: 37 (2023 07:15)  T(F): 98.1 (2023 11:01), Max: 98.6 (2023 07:15)  HR: 85 (2023 11:01) (60 - 85)  BP: 116/75 (2023 11:01) (116/75 - 141/80)  BP(mean): --  RR: 18 (2023 11:01) (18 - 18)  SpO2: 100% (2023 11:01) (100% - 100%)    Parameters below as of 2023 11:01  Patient On (Oxygen Delivery Method): room air        PHYSICAL EXAM:  General: Patient in NAD  HEENT: NCAT, EOMI  CV: S1+S2  Lungs: No respiratory distress, CTAB  Abd: Soft, nontender, no guarding  : Ulcerative/vesicular lesions visualized on the shaft of the penis, scrotum, and throughout the groin, + LAD on exam as well  Neuro: Alert and oriented to time, place and person  Ext: No cyanosis  Skin: No phlebitis                         14.2   9.53  )-----------( 230      ( 2023 06:42 )             42.5       06-20    139  |  105  |  10  ----------------------------<  105<H>  3.5   |  19<L>  |  0.96    Ca    8.8      2023 06:42    TPro  7.9  /  Alb  4.4  /  TBili  0.5  /  DBili  x   /  AST  35  /  ALT  28  /  AlkPhos  67  06-20      Urinalysis Basic - ( 2023 14:54 )    Color: Yellow / Appearance: Clear / S.030 / pH: x  Gluc: x / Ketone: Negative  / Bili: Negative / Urobili: <2 mg/dL   Blood: x / Protein: 30 mg/dL / Nitrite: Negative   Leuk Esterase: Negative / RBC: 3 /HPF / WBC 1 /HPF   Sq Epi: x / Non Sq Epi: x / Bacteria: Negative        MICROBIOLOGY:  v                  RADIOLOGY:  imaging reviewed   Follow Up:  penile lesions    Interval History/ROS:  Overnight: No acute event no acute events.  Patient remains afebrile.  Otherwise hemodynamically stable.  Latest labs show no leukocytosis, BMP with renal function within normal limits.  Syphilis screen negative, hepatitis A, B, C serologies nonreactive.  Chlamydia/GC/GC negative, HIV negative.    Patient seen examined at bedside.  No new complaints. Continued penile and scrotal pain.    Allergies  predniSONE (Swelling)    ANTIMICROBIALS:  acyclovir IVPB    acyclovir IVPB 1000 every 8 hours      OTHER MEDS:  MEDICATIONS  (STANDING):  acetaminophen     Tablet .. 650 every 6 hours PRN  aluminum hydroxide/magnesium hydroxide/simethicone Suspension 30 every 4 hours PRN  enoxaparin Injectable 40 every 24 hours  HYDROmorphone  Injectable 1 every 4 hours PRN  melatonin 3 at bedtime PRN  ondansetron Injectable 4 every 8 hours PRN      Vital Signs Last 24 Hrs  T(C): 36.7 (2023 11:01), Max: 37 (2023 07:15)  T(F): 98.1 (2023 11:01), Max: 98.6 (2023 07:15)  HR: 85 (2023 11:01) (60 - 85)  BP: 116/75 (2023 11:01) (116/75 - 141/80)  BP(mean): --  RR: 18 (2023 11:01) (18 - 18)  SpO2: 100% (2023 11:01) (100% - 100%)    Parameters below as of 2023 11:01  Patient On (Oxygen Delivery Method): room air        PHYSICAL EXAM:  General: Patient in NAD  HEENT: NCAT, EOMI  CV: S1+S2  Lungs: No respiratory distress, CTAB  Abd: Soft, nontender, no guarding  : Ulcerative/vesicular lesions visualized on the shaft of the penis, scrotum, and throughout the groin, + LAD on exam as well  Neuro: Alert and oriented to time, place and person  Ext: No cyanosis  Skin: No phlebitis                         14.2   9.53  )-----------( 230      ( 2023 06:42 )             42.5       06-20    139  |  105  |  10  ----------------------------<  105<H>  3.5   |  19<L>  |  0.96    Ca    8.8      2023 06:42    TPro  7.9  /  Alb  4.4  /  TBili  0.5  /  DBili  x   /  AST  35  /  ALT  28  /  AlkPhos  67  06-20      Urinalysis Basic - ( 2023 14:54 )    Color: Yellow / Appearance: Clear / S.030 / pH: x  Gluc: x / Ketone: Negative  / Bili: Negative / Urobili: <2 mg/dL   Blood: x / Protein: 30 mg/dL / Nitrite: Negative   Leuk Esterase: Negative / RBC: 3 /HPF / WBC 1 /HPF   Sq Epi: x / Non Sq Epi: x / Bacteria: Negative        MICROBIOLOGY:  v      RADIOLOGY:  imaging reviewed

## 2023-06-20 NOTE — PROGRESS NOTE ADULT - ASSESSMENT
28y/o M with MHx of hidradenitis suppurativa (not on treatment currently), asthma, genital HSV 2  a/w penile edema and extensive genital rash due to likely severe HSV flare.

## 2023-06-20 NOTE — CONSULT NOTE ADULT - SUBJECTIVE AND OBJECTIVE BOX
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     DERM: Derm consulted for wound of the penis x few days per patient. It did start as blisters but now has left an open area that is painful. He states he was taking 2 g daily of Valtrex x 3 days and has not improved. The last time he took Valtrex in march, it did go away. He is hesitant to use Vaseline over the area.     Currently on acyclovir.         PAST MEDICAL & SURGICAL HISTORY:  Hidradenitis      Asthma      Recurrent genital HSV (herpes simplex virus) infection      No significant past surgical history          REVIEW OF SYSTEMS    General: no fevers/chills, no lethargy	    Skin/Breast: see HPI  	  Ophthalmologic: no eye pain or change in vision  	  ENMT: no dysphagia or change in hearing    Respiratory and Thorax: no SOB or cough  	  Cardiovascular: no palpitations or chest pain    Gastrointestinal: no abdominal pain or blood in stool     Genitourinary: no dysuria or frequency    Musculoskeletal: no joint pains or weakness	    Neurological: no weakness, numbness, or tingling      MEDICATIONS  (STANDING):  acyclovir IVPB      acyclovir IVPB 1000 milliGRAM(s) IV Intermittent every 8 hours  enoxaparin Injectable 40 milliGRAM(s) SubCutaneous every 24 hours  sodium chloride 0.9%. 1000 milliLiter(s) (75 mL/Hr) IV Continuous <Continuous>    MEDICATIONS  (PRN):  acetaminophen     Tablet .. 650 milliGRAM(s) Oral every 6 hours PRN Temp greater or equal to 38C (100.4F), Mild Pain (1 - 3)  aluminum hydroxide/magnesium hydroxide/simethicone Suspension 30 milliLiter(s) Oral every 4 hours PRN Dyspepsia  HYDROmorphone  Injectable 1 milliGRAM(s) IV Push every 4 hours PRN Severe Pain (7 - 10)  melatonin 3 milliGRAM(s) Oral at bedtime PRN Insomnia  ondansetron Injectable 4 milliGRAM(s) IV Push every 8 hours PRN Nausea and/or Vomiting      Allergies    predniSONE (Swelling)    Intolerances        SOCIAL HISTORY: family at bedside yesterday     FAMILY HISTORY:  No pertinent family history in first degree relatives        Vital Signs Last 24 Hrs  T(C): 37.2 (2023 17:27), Max: 37.2 (2023 17:27)  T(F): 98.9 (2023 17:27), Max: 98.9 (2023 17:27)  HR: 73 (2023 17:27) (60 - 85)  BP: 139/86 (2023 17:27) (116/75 - 141/80)  BP(mean): --  RR: 18 (2023 17:27) (18 - 18)  SpO2: 100% (2023 17:27) (100% - 100%)    Parameters below as of 2023 17:27  Patient On (Oxygen Delivery Method): room air        PHYSICAL EXAM:    General: Well appearing, well nourished, in no apparent distress  HEENT: Normocephalic, thyroid not visibly enlarged, conjunctiva not injected, oropharynx clear without ulcerations  CV: No lower extremity edema, extremities warm and well perfused, +dorsalis pedis pulses  Resp: Non labored breathing, no clubbing of extremities  GI: Non distended abdomen, no palpable hepatosplenomegaly  Lymph: No visible lymphadenopathy  Neuro: Alert and oriented x3  Skin: The scalp/hair, head/face, conjunctivae/lids, lips/teeth/gums, neck, digits/nails, right and left axilla, right and left upper and lower extremities, chest, abdomen, back, buttocks and groin area. No bromhidrosis or hyperhidrosis.    Of note on skin exam:  erosions with scalloped borders of the penile shaft     LABS:                        14.2   9.53  )-----------( 230      ( 2023 06:42 )             42.5     06-20    139  |  105  |  10  ----------------------------<  105<H>  3.5   |  19<L>  |  0.96    Ca    8.8      2023 06:42    TPro  7.9  /  Alb  4.4  /  TBili  0.5  /  DBili  x   /  AST  35  /  ALT  28  /  AlkPhos  67  06-20      Urinalysis Basic - ( 2023 14:54 )    Color: Yellow / Appearance: Clear / S.030 / pH: x  Gluc: x / Ketone: Negative  / Bili: Negative / Urobili: <2 mg/dL   Blood: x / Protein: 30 mg/dL / Nitrite: Negative   Leuk Esterase: Negative / RBC: 3 /HPF / WBC 1 /HPF   Sq Epi: x / Non Sq Epi: x / Bacteria: Negative        RADIOLOGY & ADDITIONAL STUDIES:

## 2023-06-20 NOTE — PROGRESS NOTE ADULT - ASSESSMENT
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    Impression  #Penile/scrotal ulcerative lesions with associated LAD    Recommendations  Unclear etiology for these lesions however given patient's history, will start treating for HSV with acyclovir  Viral swab obtained at bedside and sent, follow  Maintain adequate fluid hydration while on acyclovir to avoid crystal nephropathy  Obtain GC, chlamydia, syphilis  Obtain dermatology evaluation    Howard Gamboa MD  Division of Infectious Diseases 27-year-old male with a past medical history of hidradenitis suppurativa not on current treatment, asthma, genital HSV 2 on valacyclovir who is admitted to the hospital due to development of ulcerating lesions on and around penis.    Impression  #Penile/scrotal ulcerative lesions with associated LAD  Negative HIV, syphilis, GC/Chlamdia  Positive HSV1 PCR from lesions  Persistent pain today    Recommendations  Continue acyclovir given PCR result  Maintain adequate fluid hydration while on acyclovir to avoid crystal nephropathy  Persistent pain today - monitor, pain control  Follow fever curve and WBC count    Howard Gamboa MD  Division of Infectious Diseases

## 2023-06-20 NOTE — CONSULT NOTE ADULT - ATTENDING COMMENTS
I was physically present for the key portions of the evaluation and management (E/M) service provided.  I agree with the above history, physical, and plan which I have reviewed and edited where appropriate.
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    Patient reports developing lesions about a week prior to admission involving his groin and penis.  Patient reported that he is developed into ulcerative lesions.  Had started taking 2 g of valacyclovir per day however reports this is ineffective.  Patient denies any other symptoms such as fevers, chills.  Patient reports last sexual activity in February 2023 involving men and women.    In the ED, no fevers, no leukocytosis, HIV negative.  Patient otherwise denies any new symptoms or discomfort.  Does report the lesions are painful.  On exam, lesions appear to have ulcerated, no dome lesions noted, no vesicular looking lesions noted.  However lymphadenopathy noted along groin.    Impression  #Penile/scrotal ulcerative lesions with associated LAD    Recommendations  Unclear etiology for these lesions however given patient's history, will start treating for HSV with acyclovir  Viral swab obtained at bedside and sent, follow  Maintain adequate fluid hydration while on acyclovir to avoid crystal nephropathy  Obtain GC, chlamydia, syphilis  Obtain dermatology evaluation    Howard Gamboa MD  Division of Infectious Diseases

## 2023-06-20 NOTE — PROVIDER CONTACT NOTE (OTHER) - SITUATION
patients Antibiotic (acyclovir) was paused  after arriving to floor, medication re-started at 1310 , was pending IV pump as well

## 2023-06-20 NOTE — PROGRESS NOTE ADULT - SUBJECTIVE AND OBJECTIVE BOX
[facility] Medicine  Dr. Sushma Foster  Pager [phone]    Patient is a 27y old  Male who presents with a chief complaint of Penile severe pain and rash (2023 20:04)    SUBJECTIVE / OVERNIGHT EVENTS: Patient seen and examined. Still with pain. Still difficulty urinating but is able to urinate. Denies any other concerns. Says he was seen by derm who told him to use petroleum jelly barrier for wound care.     MEDICATIONS  (STANDING):  acyclovir IVPB      acyclovir IVPB 1000 milliGRAM(s) IV Intermittent every 8 hours  enoxaparin Injectable 40 milliGRAM(s) SubCutaneous every 24 hours  sodium chloride 0.9%. 1000 milliLiter(s) (75 mL/Hr) IV Continuous <Continuous>    MEDICATIONS  (PRN):  acetaminophen     Tablet .. 650 milliGRAM(s) Oral every 6 hours PRN Temp greater or equal to 38C (100.4F), Mild Pain (1 - 3)  aluminum hydroxide/magnesium hydroxide/simethicone Suspension 30 milliLiter(s) Oral every 4 hours PRN Dyspepsia  HYDROmorphone  Injectable 1 milliGRAM(s) IV Push every 4 hours PRN Severe Pain (7 - 10)  melatonin 3 milliGRAM(s) Oral at bedtime PRN Insomnia  ondansetron Injectable 4 milliGRAM(s) IV Push every 8 hours PRN Nausea and/or Vomiting      Vital Signs Last 24 Hrs  T(C): 36.7 (2023 11:01), Max: 37 (2023 07:15)  T(F): 98.1 (2023 11:01), Max: 98.6 (2023 07:15)  HR: 85 (2023 11:01) (60 - 85)  BP: 116/75 (2023 11:01) (116/75 - 141/80)  BP(mean): --  RR: 18 (2023 11:01) (18 - 18)  SpO2: 100% (2023 11:01) (100% - 100%)    Parameters below as of 2023 11:01  Patient On (Oxygen Delivery Method): room air      PHYSICAL EXAM:  GENERAL: NAD, well-developed  HEAD:  Atraumatic, Normocephalic  EYES: EOMI, PERRLA, conjunctiva and sclera clear  NECK: Supple, No JVD  CHEST/LUNG: Clear to auscultation bilaterally; No wheeze  HEART: Regular rate and rhythm; No murmurs, rubs, or gallops  ABDOMEN: Soft, Nontender, Nondistended; Bowel sounds present  EXTREMITIES:  2+ Peripheral Pulses, No clubbing, cyanosis, or edema   - ulceration noted around the penile base  PSYCH: AAOx3  NEUROLOGY: non-focal  SKIN: No rashes or lesions    LABS:                        14.2   9.53  )-----------( 230      ( 2023 06:42 )             42.5     -    139  |  105  |  10  ----------------------------<  105<H>  3.5   |  19<L>  |  0.96    Ca    8.8      2023 06:42    TPro  7.9  /  Alb  4.4  /  TBili  0.5  /  DBili  x   /  AST  35  /  ALT  28  /  AlkPhos  67  -      Urinalysis Basic - ( 2023 14:54 )    Color: Yellow / Appearance: Clear / S.030 / pH: x  Gluc: x / Ketone: Negative  / Bili: Negative / Urobili: <2 mg/dL   Blood: x / Protein: 30 mg/dL / Nitrite: Negative   Leuk Esterase: Negative / RBC: 3 /HPF / WBC 1 /HPF   Sq Epi: x / Non Sq Epi: x / Bacteria: Negative        RADIOLOGY & ADDITIONAL TESTS:    Imaging Personally Reviewed:    Consultant(s) Notes Reviewed:  ID, URO    Care Discussed with Consultants/Other Providers:    Assessment and Plan:

## 2023-06-20 NOTE — CONSULT NOTE ADULT - ASSESSMENT
Favor HSV, crusted and eroded now without evidence of active disease     -	Discussed wrapping the area with Vaseline and non stick gauze such as xeroform   -	f/u hsv/vzv PCR     The patient's chart was reviewed in addition to being seen and examined at bedside with the dermatology attending Dr. Terry. Recommendations were communicated with the primary team.  Please page [phone] w/10 digit call back number for further related questions.    Chayo Mchugh MD  Resident Physician, PGY3  [facility]  Pager: [phone]  Office: [phone]

## 2023-06-20 NOTE — ED ADULT NURSE REASSESSMENT NOTE - NS ED NURSE REASSESS COMMENT FT1
received pt in bed  A and Ox 3 in pain, reports current pain regiment is inadequate, Resident reached out to, pt medicated per PRN orders. comfort measures provided, food administered. fall precautions in place.

## 2023-06-20 NOTE — PROVIDER CONTACT NOTE (OTHER) - BACKGROUND
27-year-old male who was diagnosed with HSV2 in March presents for painful ulcerations over his penile shaft

## 2023-06-21 LAB
-  AMPICILLIN: SIGNIFICANT CHANGE UP
-  CIPROFLOXACIN: SIGNIFICANT CHANGE UP
-  LEVOFLOXACIN: SIGNIFICANT CHANGE UP
-  NITROFURANTOIN: SIGNIFICANT CHANGE UP
-  TETRACYCLINE: SIGNIFICANT CHANGE UP
-  VANCOMYCIN: SIGNIFICANT CHANGE UP
ANION GAP SERPL CALC-SCNC: 15 MMOL/L — HIGH (ref 7–14)
BUN SERPL-MCNC: 7 MG/DL — SIGNIFICANT CHANGE UP (ref 7–23)
CALCIUM SERPL-MCNC: 9.1 MG/DL — SIGNIFICANT CHANGE UP (ref 8.4–10.5)
CHLORIDE SERPL-SCNC: 106 MMOL/L — SIGNIFICANT CHANGE UP (ref 98–107)
CO2 SERPL-SCNC: 19 MMOL/L — LOW (ref 22–31)
CREAT SERPL-MCNC: 0.8 MG/DL — SIGNIFICANT CHANGE UP (ref 0.5–1.3)
CULTURE RESULTS: SIGNIFICANT CHANGE UP
EGFR: 125 ML/MIN/1.73M2 — SIGNIFICANT CHANGE UP
GLUCOSE SERPL-MCNC: 88 MG/DL — SIGNIFICANT CHANGE UP (ref 70–99)
HCT VFR BLD CALC: 40.5 % — SIGNIFICANT CHANGE UP (ref 39–50)
HGB BLD-MCNC: 14 G/DL — SIGNIFICANT CHANGE UP (ref 13–17)
HIV-1 VIRAL LOAD RESULT: SIGNIFICANT CHANGE UP
HIV1 RNA # SERPL NAA+PROBE: SIGNIFICANT CHANGE UP COPIES/ML
HIV1 RNA SER-IMP: SIGNIFICANT CHANGE UP
HIV1 RNA SERPL NAA+PROBE-ACNC: SIGNIFICANT CHANGE UP
HIV1 RNA SERPL NAA+PROBE-LOG#: SIGNIFICANT CHANGE UP LG COP/ML
MAGNESIUM SERPL-MCNC: 2.3 MG/DL — SIGNIFICANT CHANGE UP (ref 1.6–2.6)
MCHC RBC-ENTMCNC: 32.2 PG — SIGNIFICANT CHANGE UP (ref 27–34)
MCHC RBC-ENTMCNC: 34.6 GM/DL — SIGNIFICANT CHANGE UP (ref 32–36)
MCV RBC AUTO: 93.1 FL — SIGNIFICANT CHANGE UP (ref 80–100)
METHOD TYPE: SIGNIFICANT CHANGE UP
NRBC # BLD: 0 /100 WBCS — SIGNIFICANT CHANGE UP (ref 0–0)
NRBC # FLD: 0 K/UL — SIGNIFICANT CHANGE UP (ref 0–0)
ORGANISM # SPEC MICROSCOPIC CNT: SIGNIFICANT CHANGE UP
ORGANISM # SPEC MICROSCOPIC CNT: SIGNIFICANT CHANGE UP
PHOSPHATE SERPL-MCNC: 4.1 MG/DL — SIGNIFICANT CHANGE UP (ref 2.5–4.5)
PLATELET # BLD AUTO: 223 K/UL — SIGNIFICANT CHANGE UP (ref 150–400)
POTASSIUM SERPL-MCNC: 4.5 MMOL/L — SIGNIFICANT CHANGE UP (ref 3.5–5.3)
POTASSIUM SERPL-SCNC: 4.5 MMOL/L — SIGNIFICANT CHANGE UP (ref 3.5–5.3)
RBC # BLD: 4.35 M/UL — SIGNIFICANT CHANGE UP (ref 4.2–5.8)
RBC # FLD: 13.6 % — SIGNIFICANT CHANGE UP (ref 10.3–14.5)
SODIUM SERPL-SCNC: 140 MMOL/L — SIGNIFICANT CHANGE UP (ref 135–145)
SPECIMEN SOURCE: SIGNIFICANT CHANGE UP
WBC # BLD: 6.13 K/UL — SIGNIFICANT CHANGE UP (ref 3.8–10.5)
WBC # FLD AUTO: 6.13 K/UL — SIGNIFICANT CHANGE UP (ref 3.8–10.5)

## 2023-06-21 PROCEDURE — 99233 SBSQ HOSP IP/OBS HIGH 50: CPT

## 2023-06-21 PROCEDURE — 99232 SBSQ HOSP IP/OBS MODERATE 35: CPT

## 2023-06-21 RX ORDER — SODIUM CHLORIDE 9 MG/ML
1000 INJECTION INTRAMUSCULAR; INTRAVENOUS; SUBCUTANEOUS
Refills: 0 | Status: DISCONTINUED | OUTPATIENT
Start: 2023-06-21 | End: 2023-06-22

## 2023-06-21 RX ORDER — KETOROLAC TROMETHAMINE 30 MG/ML
15 SYRINGE (ML) INJECTION ONCE
Refills: 0 | Status: DISCONTINUED | OUTPATIENT
Start: 2023-06-21 | End: 2023-06-21

## 2023-06-21 RX ORDER — ACYCLOVIR SODIUM 500 MG
950 VIAL (EA) INTRAVENOUS EVERY 8 HOURS
Refills: 0 | Status: COMPLETED | OUTPATIENT
Start: 2023-06-22 | End: 2023-06-26

## 2023-06-21 RX ORDER — LIDOCAINE 4 G/100G
15 CREAM TOPICAL
Refills: 0 | Status: DISCONTINUED | OUTPATIENT
Start: 2023-06-21 | End: 2023-06-26

## 2023-06-21 RX ORDER — HYDROMORPHONE HYDROCHLORIDE 2 MG/ML
2 INJECTION INTRAMUSCULAR; INTRAVENOUS; SUBCUTANEOUS EVERY 4 HOURS
Refills: 0 | Status: DISCONTINUED | OUTPATIENT
Start: 2023-06-21 | End: 2023-06-22

## 2023-06-21 RX ORDER — BENZOCAINE 10 %
4 GEL (GRAM) MUCOUS MEMBRANE EVERY 4 HOURS
Refills: 0 | Status: DISCONTINUED | OUTPATIENT
Start: 2023-06-21 | End: 2023-06-26

## 2023-06-21 RX ADMIN — HYDROMORPHONE HYDROCHLORIDE 1 MILLIGRAM(S): 2 INJECTION INTRAMUSCULAR; INTRAVENOUS; SUBCUTANEOUS at 10:38

## 2023-06-21 RX ADMIN — Medication 1 APPLICATION(S): at 23:00

## 2023-06-21 RX ADMIN — HYDROMORPHONE HYDROCHLORIDE 2 MILLIGRAM(S): 2 INJECTION INTRAMUSCULAR; INTRAVENOUS; SUBCUTANEOUS at 22:59

## 2023-06-21 RX ADMIN — Medication 270 MILLIGRAM(S): at 17:18

## 2023-06-21 RX ADMIN — ONDANSETRON 4 MILLIGRAM(S): 8 TABLET, FILM COATED ORAL at 21:51

## 2023-06-21 RX ADMIN — Medication 15 MILLIGRAM(S): at 13:09

## 2023-06-21 RX ADMIN — HYDROMORPHONE HYDROCHLORIDE 1 MILLIGRAM(S): 2 INJECTION INTRAMUSCULAR; INTRAVENOUS; SUBCUTANEOUS at 10:50

## 2023-06-21 RX ADMIN — HYDROMORPHONE HYDROCHLORIDE 1 MILLIGRAM(S): 2 INJECTION INTRAMUSCULAR; INTRAVENOUS; SUBCUTANEOUS at 11:05

## 2023-06-21 RX ADMIN — SODIUM CHLORIDE 75 MILLILITER(S): 9 INJECTION INTRAMUSCULAR; INTRAVENOUS; SUBCUTANEOUS at 21:51

## 2023-06-21 RX ADMIN — Medication 270 MILLIGRAM(S): at 07:23

## 2023-06-21 RX ADMIN — HYDROMORPHONE HYDROCHLORIDE 2 MILLIGRAM(S): 2 INJECTION INTRAMUSCULAR; INTRAVENOUS; SUBCUTANEOUS at 23:17

## 2023-06-21 RX ADMIN — ENOXAPARIN SODIUM 40 MILLIGRAM(S): 100 INJECTION SUBCUTANEOUS at 21:51

## 2023-06-21 RX ADMIN — Medication 50 MILLIGRAM(S): at 13:10

## 2023-06-21 RX ADMIN — HYDROMORPHONE HYDROCHLORIDE 2 MILLIGRAM(S): 2 INJECTION INTRAMUSCULAR; INTRAVENOUS; SUBCUTANEOUS at 16:49

## 2023-06-21 NOTE — PROGRESS NOTE ADULT - ASSESSMENT
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    Impression  #Penile/scrotal ulcerative lesions with associated LAD  Negative HIV, syphilis, GC/Chlamydia, HIV VL negative,   Positive HSV1 PCR from lesions  Persistent pain today  Derm evaluation on 6/20/23    Recommendations  Continue acyclovir given PCR result  Maintain adequate fluid hydration while on acyclovir to avoid crystal nephropathy  Will plan to treat for a total of 7 days, end date: 6/26/23  Persistent pain today - monitor, pain control  Follow fever curve and WBC count    Howard Gamboa MD  Division of Infectious Diseases

## 2023-06-21 NOTE — PROGRESS NOTE ADULT - SUBJECTIVE AND OBJECTIVE BOX
Follow Up:  Genital HSV    Interval History/ROS:  Overnight: no acute events.    Seen and examined at bedside. Continued gential pain secondary to lesions, no new pain or discomfort.    Allergies  predniSONE (Swelling)      ANTIMICROBIALS:  acyclovir IVPB    acyclovir IVPB 1000 every 8 hours      OTHER MEDS:  MEDICATIONS  (STANDING):  acetaminophen     Tablet .. 650 every 6 hours PRN  aluminum hydroxide/magnesium hydroxide/simethicone Suspension 30 every 4 hours PRN  enoxaparin Injectable 40 every 24 hours  HYDROmorphone  Injectable 2 every 4 hours PRN  melatonin 3 at bedtime PRN  ondansetron Injectable 4 every 8 hours PRN  pregabalin 50 two times a day      Vital Signs Last 24 Hrs  T(C): 36.8 (21 Jun 2023 07:30), Max: 37.2 (20 Jun 2023 17:27)  T(F): 98.2 (21 Jun 2023 07:30), Max: 98.9 (20 Jun 2023 17:27)  HR: 65 (21 Jun 2023 07:30) (65 - 73)  BP: 134/76 (21 Jun 2023 07:30) (134/76 - 139/86)  BP(mean): --  RR: 18 (21 Jun 2023 07:30) (18 - 18)  SpO2: 100% (21 Jun 2023 07:30) (100% - 100%)    Parameters below as of 21 Jun 2023 07:30  Patient On (Oxygen Delivery Method): room air        PHYSICAL EXAM:  General: Patient in NAD  HEENT: NCAT, EOMI  CV: S1+S2  Lungs: No respiratory distress, CTAB  Abd: Soft, nontender, no guarding  : Declines exam today  Neuro: Alert and oriented to time, place and person  Ext: No cyanosis  Skin: No phlebitis                             14.0   6.13  )-----------( 223      ( 21 Jun 2023 07:00 )             40.5       06-21    140  |  106  |  7   ----------------------------<  88  4.5   |  19<L>  |  0.80    Ca    9.1      21 Jun 2023 07:00  Phos  4.1     06-21  Mg     2.30     06-21    TPro  7.9  /  Alb  4.4  /  TBili  0.5  /  DBili  x   /  AST  35  /  ALT  28  /  AlkPhos  67  06-20      Urinalysis Basic - ( 21 Jun 2023 07:00 )    Color: x / Appearance: x / SG: x / pH: x  Gluc: 88 mg/dL / Ketone: x  / Bili: x / Urobili: x   Blood: x / Protein: x / Nitrite: x   Leuk Esterase: x / RBC: x / WBC x   Sq Epi: x / Non Sq Epi: x / Bacteria: x        MICROBIOLOGY:  v    Culture - Urine (collected 19 Jun 2023 14:54)  Source: Clean Catch Clean Catch (Midstream)  Preliminary Report (20 Jun 2023 21:49):    10,000 - 49,000 CFU/mL Enterococcus faecalis            HIV-1 RNA Quantitative, Viral Load Log: NOT DET. lg /mL (06-20-23 @ 06:42)          RADIOLOGY:

## 2023-06-21 NOTE — PROGRESS NOTE ADULT - SUBJECTIVE AND OBJECTIVE BOX
[facility] Medicine  Dr. Sushma Foster  Pager [phone]    Patient is a 27y old  Male who presents with a chief complaint of Penile severe pain and rash (2023 20:04)    SUBJECTIVE / OVERNIGHT EVENTS: Patient seen and examined. Very frustrated during the visit. Feels that his pain is not being addressed. He wants to be cleaned up and also wants someone to help dress his wound. He feels yesterday many doctors were in the room checking his wound which was very painful. He feels let down from the medical community as no one understands his pain. I offered him support. I spoke to RN and RN manager. Staff to assist him to clean up and also help him with wound dressing. I increased his dilaudid to 2mg PRN and added lyrica BID for pain control.       MEDICATIONS  (STANDING):  acyclovir IVPB      acyclovir IVPB 1000 milliGRAM(s) IV Intermittent every 8 hours  enoxaparin Injectable 40 milliGRAM(s) SubCutaneous every 24 hours  sodium chloride 0.9%. 1000 milliLiter(s) (75 mL/Hr) IV Continuous <Continuous>    MEDICATIONS  (PRN):  acetaminophen     Tablet .. 650 milliGRAM(s) Oral every 6 hours PRN Temp greater or equal to 38C (100.4F), Mild Pain (1 - 3)  aluminum hydroxide/magnesium hydroxide/simethicone Suspension 30 milliLiter(s) Oral every 4 hours PRN Dyspepsia  HYDROmorphone  Injectable 1 milliGRAM(s) IV Push every 4 hours PRN Severe Pain (7 - 10)  melatonin 3 milliGRAM(s) Oral at bedtime PRN Insomnia  ondansetron Injectable 4 milliGRAM(s) IV Push every 8 hours PRN Nausea and/or Vomiting    Vital Signs Last 24 Hrs  T(C): 36.8 (2023 07:30), Max: 37.2 (2023 17:27)  T(F): 98.2 (2023 07:30), Max: 98.9 (2023 17:27)  HR: 65 (2023 07:30) (65 - 73)  BP: 134/76 (2023 07:30) (134/76 - 139/86)  BP(mean): --  RR: 18 (2023 07:30) (18 - 18)  SpO2: 100% (2023 07:30) (100% - 100%)    Parameters below as of 2023 07:30  Patient On (Oxygen Delivery Method): room air      PHYSICAL EXAM:  GENERAL: NAD, well-developed  HEAD:  Atraumatic, Normocephalic  EYES: EOMI, PERRLA, conjunctiva and sclera clear  NECK: Supple, No JVD  CHEST/LUNG: Clear to auscultation bilaterally; No wheeze  HEART: Regular rate and rhythm; No murmurs, rubs, or gallops  ABDOMEN: Soft, Nontender, Nondistended; Bowel sounds present  EXTREMITIES:  2+ Peripheral Pulses, No clubbing, cyanosis, or edema   - ulceration noted around the penile base  PSYCH: AAOx3  NEUROLOGY: non-focal  SKIN: No rashes or lesions    LABS:                                   14.0   6.13  )-----------( 223      ( 2023 07:00 )             40.5   06-21    140  |  106  |  7   ----------------------------<  88  4.5   |  19<L>  |  0.80    Ca    9.1      2023 07:00  Phos  4.1     06-21  Mg     2.30     06-21    TPro  7.9  /  Alb  4.4  /  TBili  0.5  /  DBili  x   /  AST  35  /  ALT  28  /  AlkPhos  67  06-20        Urinalysis Basic - ( 2023 14:54 )    Color: Yellow / Appearance: Clear / S.030 / pH: x  Gluc: x / Ketone: Negative  / Bili: Negative / Urobili: <2 mg/dL   Blood: x / Protein: 30 mg/dL / Nitrite: Negative   Leuk Esterase: Negative / RBC: 3 /HPF / WBC 1 /HPF   Sq Epi: x / Non Sq Epi: x / Bacteria: Negative        RADIOLOGY & ADDITIONAL TESTS:    Imaging Personally Reviewed:    Consultant(s) Notes Reviewed:  ID, URO, derm    Care Discussed with Consultants/Other Providers:    Assessment and Plan:

## 2023-06-21 NOTE — PROGRESS NOTE ADULT - ASSESSMENT
26y/o M with MHx of hidradenitis suppurativa (not on treatment currently), asthma, genital HSV 2  a/w penile edema and extensive genital rash due to likely severe HSV flare.

## 2023-06-22 LAB
ANION GAP SERPL CALC-SCNC: 13 MMOL/L — SIGNIFICANT CHANGE UP (ref 7–14)
BUN SERPL-MCNC: 7 MG/DL — SIGNIFICANT CHANGE UP (ref 7–23)
CALCIUM SERPL-MCNC: 8.8 MG/DL — SIGNIFICANT CHANGE UP (ref 8.4–10.5)
CHLORIDE SERPL-SCNC: 105 MMOL/L — SIGNIFICANT CHANGE UP (ref 98–107)
CO2 SERPL-SCNC: 21 MMOL/L — LOW (ref 22–31)
CREAT SERPL-MCNC: 0.79 MG/DL — SIGNIFICANT CHANGE UP (ref 0.5–1.3)
EGFR: 126 ML/MIN/1.73M2 — SIGNIFICANT CHANGE UP
GLUCOSE SERPL-MCNC: 93 MG/DL — SIGNIFICANT CHANGE UP (ref 70–99)
HCT VFR BLD CALC: 39.7 % — SIGNIFICANT CHANGE UP (ref 39–50)
HGB BLD-MCNC: 13.9 G/DL — SIGNIFICANT CHANGE UP (ref 13–17)
MAGNESIUM SERPL-MCNC: 2.1 MG/DL — SIGNIFICANT CHANGE UP (ref 1.6–2.6)
MCHC RBC-ENTMCNC: 32.1 PG — SIGNIFICANT CHANGE UP (ref 27–34)
MCHC RBC-ENTMCNC: 35 GM/DL — SIGNIFICANT CHANGE UP (ref 32–36)
MCV RBC AUTO: 91.7 FL — SIGNIFICANT CHANGE UP (ref 80–100)
NRBC # BLD: 0 /100 WBCS — SIGNIFICANT CHANGE UP (ref 0–0)
NRBC # FLD: 0 K/UL — SIGNIFICANT CHANGE UP (ref 0–0)
PHOSPHATE SERPL-MCNC: 4.8 MG/DL — HIGH (ref 2.5–4.5)
PLATELET # BLD AUTO: 237 K/UL — SIGNIFICANT CHANGE UP (ref 150–400)
POTASSIUM SERPL-MCNC: 3.6 MMOL/L — SIGNIFICANT CHANGE UP (ref 3.5–5.3)
POTASSIUM SERPL-SCNC: 3.6 MMOL/L — SIGNIFICANT CHANGE UP (ref 3.5–5.3)
RBC # BLD: 4.33 M/UL — SIGNIFICANT CHANGE UP (ref 4.2–5.8)
RBC # FLD: 13.4 % — SIGNIFICANT CHANGE UP (ref 10.3–14.5)
SODIUM SERPL-SCNC: 139 MMOL/L — SIGNIFICANT CHANGE UP (ref 135–145)
WBC # BLD: 6.21 K/UL — SIGNIFICANT CHANGE UP (ref 3.8–10.5)
WBC # FLD AUTO: 6.21 K/UL — SIGNIFICANT CHANGE UP (ref 3.8–10.5)

## 2023-06-22 PROCEDURE — 99233 SBSQ HOSP IP/OBS HIGH 50: CPT

## 2023-06-22 RX ORDER — SODIUM CHLORIDE 9 MG/ML
1000 INJECTION INTRAMUSCULAR; INTRAVENOUS; SUBCUTANEOUS
Refills: 0 | Status: DISCONTINUED | OUTPATIENT
Start: 2023-06-22 | End: 2023-06-24

## 2023-06-22 RX ORDER — HYDROMORPHONE HYDROCHLORIDE 2 MG/ML
2 INJECTION INTRAMUSCULAR; INTRAVENOUS; SUBCUTANEOUS EVERY 4 HOURS
Refills: 0 | Status: DISCONTINUED | OUTPATIENT
Start: 2023-06-22 | End: 2023-06-26

## 2023-06-22 RX ADMIN — HYDROMORPHONE HYDROCHLORIDE 2 MILLIGRAM(S): 2 INJECTION INTRAMUSCULAR; INTRAVENOUS; SUBCUTANEOUS at 21:01

## 2023-06-22 RX ADMIN — ONDANSETRON 4 MILLIGRAM(S): 8 TABLET, FILM COATED ORAL at 14:26

## 2023-06-22 RX ADMIN — Medication 269 MILLIGRAM(S): at 01:21

## 2023-06-22 RX ADMIN — Medication 1 APPLICATION(S): at 22:00

## 2023-06-22 RX ADMIN — HYDROMORPHONE HYDROCHLORIDE 2 MILLIGRAM(S): 2 INJECTION INTRAMUSCULAR; INTRAVENOUS; SUBCUTANEOUS at 21:16

## 2023-06-22 RX ADMIN — Medication 50 MILLIGRAM(S): at 01:19

## 2023-06-22 RX ADMIN — Medication 269 MILLIGRAM(S): at 17:06

## 2023-06-22 RX ADMIN — Medication 1 APPLICATION(S): at 09:42

## 2023-06-22 RX ADMIN — HYDROMORPHONE HYDROCHLORIDE 2 MILLIGRAM(S): 2 INJECTION INTRAMUSCULAR; INTRAVENOUS; SUBCUTANEOUS at 15:15

## 2023-06-22 RX ADMIN — Medication 50 MILLIGRAM(S): at 17:07

## 2023-06-22 RX ADMIN — SODIUM CHLORIDE 75 MILLILITER(S): 9 INJECTION INTRAMUSCULAR; INTRAVENOUS; SUBCUTANEOUS at 17:08

## 2023-06-22 RX ADMIN — LIDOCAINE 15 MILLILITER(S): 4 CREAM TOPICAL at 21:59

## 2023-06-22 RX ADMIN — Medication 269 MILLIGRAM(S): at 09:41

## 2023-06-22 RX ADMIN — SODIUM CHLORIDE 75 MILLILITER(S): 9 INJECTION INTRAMUSCULAR; INTRAVENOUS; SUBCUTANEOUS at 09:41

## 2023-06-22 RX ADMIN — HYDROMORPHONE HYDROCHLORIDE 2 MILLIGRAM(S): 2 INJECTION INTRAMUSCULAR; INTRAVENOUS; SUBCUTANEOUS at 14:49

## 2023-06-22 RX ADMIN — HYDROMORPHONE HYDROCHLORIDE 2 MILLIGRAM(S): 2 INJECTION INTRAMUSCULAR; INTRAVENOUS; SUBCUTANEOUS at 11:00

## 2023-06-22 RX ADMIN — HYDROMORPHONE HYDROCHLORIDE 2 MILLIGRAM(S): 2 INJECTION INTRAMUSCULAR; INTRAVENOUS; SUBCUTANEOUS at 10:33

## 2023-06-22 NOTE — DISCHARGE NOTE PROVIDER - HOSPITAL COURSE
27 M with a PMH of hidradenitis suppurativa (not on treatment currently), asthma, genital HSV 2 suppressive Valtrex, who presented to the ED with concern of acute genital HSV flare. Culture positive for HSV1. Seen by derm and ID. Started on acyclovir with IVF. Patient to follow up as outpatient. 27 M with a PMH of hidradenitis suppurativa (not on treatment currently), asthma, genital HSV 2 suppressive Valtrex, who presented to the ED with concern of acute genital HSV flare. Culture positive for HSV1. Seen by derm and ID. Started on acyclovir with IVF, for which he completed a 7 day course. Recommended to have suppressive Valtrex on discharge. Seen by  psychiatry for depression and recommended outpatient following. Patient stable for discharge home with outpatient follow-up.

## 2023-06-22 NOTE — DISCHARGE NOTE PROVIDER - NSFOLLOWUPCLINICSTOKEN_GEN_ALL_ED_FT
[id number]: || ||00\01||False; [id number]: || ||00\01||False;[id number]: || ||00\01||False;[id number]: || ||00\01||False;

## 2023-06-22 NOTE — DISCHARGE NOTE PROVIDER - DID THE PATIENT PRESENT WITH OR WAS TREATED FOR MALNUTRITION DURING THIS ADMISSION
Timoteo Harris was seen and treated in our emergency department on 11/7/2022. She may return to work on 11/09/2022. If you have any questions or concerns, please don't hesitate to call.       Alec Baker MD
No

## 2023-06-22 NOTE — PROGRESS NOTE ADULT - SUBJECTIVE AND OBJECTIVE BOX
[facility] Medicine  Dr. Sushma Foster  Pager [phone]    Patient is a 27y old  Male who presents with a chief complaint of Penile severe pain and rash (2023 20:04)    SUBJECTIVE / OVERNIGHT EVENTS: Patient seen and examined. still c/o pain especially during dressing change. Feels he needs to be cleaned twice a day to avoid sweat buildup. Overall feels frustrated with the situation.        MEDICATIONS  (STANDING):  acyclovir IVPB      acyclovir IVPB 1000 milliGRAM(s) IV Intermittent every 8 hours  enoxaparin Injectable 40 milliGRAM(s) SubCutaneous every 24 hours  sodium chloride 0.9%. 1000 milliLiter(s) (75 mL/Hr) IV Continuous <Continuous>    MEDICATIONS  (PRN):  acetaminophen     Tablet .. 650 milliGRAM(s) Oral every 6 hours PRN Temp greater or equal to 38C (100.4F), Mild Pain (1 - 3)  aluminum hydroxide/magnesium hydroxide/simethicone Suspension 30 milliLiter(s) Oral every 4 hours PRN Dyspepsia  HYDROmorphone  Injectable 1 milliGRAM(s) IV Push every 4 hours PRN Severe Pain (7 - 10)  melatonin 3 milliGRAM(s) Oral at bedtime PRN Insomnia  ondansetron Injectable 4 milliGRAM(s) IV Push every 8 hours PRN Nausea and/or Vomiting    Vital Signs Last 24 Hrs  T(C): 36.4 (2023 05:30), Max: 36.7 (2023 21:30)  T(F): 97.5 (2023 05:30), Max: 98.1 (2023 21:30)  HR: 80 (2023 05:30) (75 - 80)  BP: 131/70 (2023 05:30) (131/70 - 148/90)  BP(mean): --  RR: 16 (2023 05:30) (16 - 16)  SpO2: 100% (2023 05:30) (100% - 100%)    Parameters below as of 2023 05:30  Patient On (Oxygen Delivery Method): room air      PHYSICAL EXAM:  GENERAL: NAD, well-developed  HEAD:  Atraumatic, Normocephalic  EYES: EOMI, PERRLA, conjunctiva and sclera clear  NECK: Supple, No JVD  CHEST/LUNG: Clear to auscultation bilaterally; No wheeze  HEART: Regular rate and rhythm; No murmurs, rubs, or gallops  ABDOMEN: Soft, Nontender, Nondistended; Bowel sounds present  EXTREMITIES:  2+ Peripheral Pulses, No clubbing, cyanosis, or edema   - ulceration noted around the penile base  PSYCH: AAOx3  NEUROLOGY: non-focal      LABS:                                              13.9   6.21  )-----------( 237      ( 2023 06:43 )             39.7       139  |  105  |  7   ----------------------------<  93  3.6   |  21<L>  |  0.79    Ca    8.8      2023 06:43  Phos  4.8       Mg     2.10             Urinalysis Basic - ( 2023 14:54 )    Color: Yellow / Appearance: Clear / S.030 / pH: x  Gluc: x / Ketone: Negative  / Bili: Negative / Urobili: <2 mg/dL   Blood: x / Protein: 30 mg/dL / Nitrite: Negative   Leuk Esterase: Negative / RBC: 3 /HPF / WBC 1 /HPF   Sq Epi: x / Non Sq Epi: x / Bacteria: Negative        RADIOLOGY & ADDITIONAL TESTS:    Imaging Personally Reviewed:    Consultant(s) Notes Reviewed:  ID, URO, derm    Care Discussed with Consultants/Other Providers:    Assessment and Plan:

## 2023-06-22 NOTE — DISCHARGE NOTE PROVIDER - NSDCMRMEDTOKEN_GEN_ALL_CORE_FT
valACYclovir 1 g oral tablet: 2 tab(s) orally once a day   lidocaine 2% mucous membrane solution: Apply topically to affected area 4 times a day as needed for  severe pain HSV Pain  pregabalin 50 mg oral capsule: 1 cap(s) orally 2 times a day MDD: 100mg  valACYclovir 1 g oral tablet: 1 tab(s) orally once a day

## 2023-06-22 NOTE — DISCHARGE NOTE PROVIDER - CARE PROVIDER_API CALL
Abhishek Hills.  Infectious Disease  [address] [place] [id number]  Phone: [phone]  Fax: [phone]  Follow Up Time:

## 2023-06-22 NOTE — DISCHARGE NOTE PROVIDER - ATTENDING DISCHARGE PHYSICAL EXAMINATION:
Patient seen and examined. Reports that he would go home today. States that he needs suppressive medication to go home, is upset that his PCP discontinued it. Discussed discharge home today after last dose of IV acyclovir this evening. Discussed with ID Dr. Hills, recommends suppressive therapy with Valtrex 1g daily and outpatient follow-up in 6 weeks.     PHYSICAL EXAM:  GENERAL: NAD, well-developed  HEAD:  Atraumatic, Normocephalic  EYES: conjunctiva and sclera clear  CHEST/LUNG: normal respirations   HEART: Regular rate and rhythm; No murmurs, rubs, or gallops  ABDOMEN: Soft, Nontender, Nondistended  PSYCH: AAOx3; calm and cooperative; odd affect   NEUROLOGY: non-focal; moving all extremities; answering all questions appropriately   SKIN: Patient declined genital examination Patient seen and examined. Reports that he would go home today. States that he needs suppressive medication to go home, is upset that his PCP discontinued it. Discussed discharge home today after last dose of IV acyclovir this evening. Discussed with ID Dr. Hills, recommends suppressive therapy with Valtrex 1g daily and outpatient follow-up in 6 weeks.     PHYSICAL EXAM:  GENERAL: NAD, well-developed  HEAD:  Atraumatic, Normocephalic  EYES: conjunctiva and sclera clear  CHEST/LUNG: normal respirations   HEART: Regular rate and rhythm; No murmurs, rubs, or gallops  ABDOMEN: Soft, Nontender, Nondistended  PSYCH: AAOx3; calm and cooperative; odd affect   NEUROLOGY: non-focal; moving all extremities; answering all questions appropriately.   SKIN: Patient declined genital examination

## 2023-06-22 NOTE — DISCHARGE NOTE PROVIDER - NSFOLLOWUPCLINICS_GEN_ALL_ED_FT
[facility] - Infectious Disease  Infectious Disease  [address], [place] [address]  Phone: [phone]  Fax:      [facility] - Infectious Disease  Infectious Disease  [address], [place] [address]  Phone: [phone]  Fax:     [facility] [place]  Dermatology  [address] [place] [address], [address]  [place] [address]  Phone: [phone]  Fax: [phone]    [facility]  Psychiatry  [address] [place] [address]  Phone: [phone]  Fax:

## 2023-06-23 LAB
ANION GAP SERPL CALC-SCNC: 12 MMOL/L — SIGNIFICANT CHANGE UP (ref 7–14)
BUN SERPL-MCNC: 6 MG/DL — LOW (ref 7–23)
CALCIUM SERPL-MCNC: 8.6 MG/DL — SIGNIFICANT CHANGE UP (ref 8.4–10.5)
CHLORIDE SERPL-SCNC: 103 MMOL/L — SIGNIFICANT CHANGE UP (ref 98–107)
CO2 SERPL-SCNC: 24 MMOL/L — SIGNIFICANT CHANGE UP (ref 22–31)
CREAT SERPL-MCNC: 0.83 MG/DL — SIGNIFICANT CHANGE UP (ref 0.5–1.3)
EGFR: 124 ML/MIN/1.73M2 — SIGNIFICANT CHANGE UP
GLUCOSE SERPL-MCNC: 87 MG/DL — SIGNIFICANT CHANGE UP (ref 70–99)
HCT VFR BLD CALC: 36.3 % — LOW (ref 39–50)
HGB BLD-MCNC: 12 G/DL — LOW (ref 13–17)
MAGNESIUM SERPL-MCNC: 1.9 MG/DL — SIGNIFICANT CHANGE UP (ref 1.6–2.6)
MCHC RBC-ENTMCNC: 30.9 PG — SIGNIFICANT CHANGE UP (ref 27–34)
MCHC RBC-ENTMCNC: 33.1 GM/DL — SIGNIFICANT CHANGE UP (ref 32–36)
MCV RBC AUTO: 93.6 FL — SIGNIFICANT CHANGE UP (ref 80–100)
NRBC # BLD: 0 /100 WBCS — SIGNIFICANT CHANGE UP (ref 0–0)
NRBC # FLD: 0 K/UL — SIGNIFICANT CHANGE UP (ref 0–0)
PHOSPHATE SERPL-MCNC: 4.2 MG/DL — SIGNIFICANT CHANGE UP (ref 2.5–4.5)
PLATELET # BLD AUTO: 215 K/UL — SIGNIFICANT CHANGE UP (ref 150–400)
POTASSIUM SERPL-MCNC: 3.7 MMOL/L — SIGNIFICANT CHANGE UP (ref 3.5–5.3)
POTASSIUM SERPL-SCNC: 3.7 MMOL/L — SIGNIFICANT CHANGE UP (ref 3.5–5.3)
RBC # BLD: 3.88 M/UL — LOW (ref 4.2–5.8)
RBC # FLD: 13.7 % — SIGNIFICANT CHANGE UP (ref 10.3–14.5)
SODIUM SERPL-SCNC: 139 MMOL/L — SIGNIFICANT CHANGE UP (ref 135–145)
WBC # BLD: 6.79 K/UL — SIGNIFICANT CHANGE UP (ref 3.8–10.5)
WBC # FLD AUTO: 6.79 K/UL — SIGNIFICANT CHANGE UP (ref 3.8–10.5)

## 2023-06-23 PROCEDURE — 99233 SBSQ HOSP IP/OBS HIGH 50: CPT

## 2023-06-23 RX ADMIN — ONDANSETRON 4 MILLIGRAM(S): 8 TABLET, FILM COATED ORAL at 22:07

## 2023-06-23 RX ADMIN — LIDOCAINE 15 MILLILITER(S): 4 CREAM TOPICAL at 22:51

## 2023-06-23 RX ADMIN — Medication 1 APPLICATION(S): at 09:56

## 2023-06-23 RX ADMIN — ENOXAPARIN SODIUM 40 MILLIGRAM(S): 100 INJECTION SUBCUTANEOUS at 22:51

## 2023-06-23 RX ADMIN — HYDROMORPHONE HYDROCHLORIDE 2 MILLIGRAM(S): 2 INJECTION INTRAMUSCULAR; INTRAVENOUS; SUBCUTANEOUS at 11:24

## 2023-06-23 RX ADMIN — Medication 50 MILLIGRAM(S): at 17:22

## 2023-06-23 RX ADMIN — LIDOCAINE 15 MILLILITER(S): 4 CREAM TOPICAL at 11:25

## 2023-06-23 RX ADMIN — ONDANSETRON 4 MILLIGRAM(S): 8 TABLET, FILM COATED ORAL at 00:23

## 2023-06-23 RX ADMIN — ONDANSETRON 4 MILLIGRAM(S): 8 TABLET, FILM COATED ORAL at 09:53

## 2023-06-23 RX ADMIN — Medication 50 MILLIGRAM(S): at 06:18

## 2023-06-23 RX ADMIN — Medication 269 MILLIGRAM(S): at 01:47

## 2023-06-23 RX ADMIN — HYDROMORPHONE HYDROCHLORIDE 2 MILLIGRAM(S): 2 INJECTION INTRAMUSCULAR; INTRAVENOUS; SUBCUTANEOUS at 11:09

## 2023-06-23 RX ADMIN — Medication 269 MILLIGRAM(S): at 17:20

## 2023-06-23 RX ADMIN — Medication 1 APPLICATION(S): at 22:19

## 2023-06-23 RX ADMIN — Medication 269 MILLIGRAM(S): at 09:53

## 2023-06-23 RX ADMIN — HYDROMORPHONE HYDROCHLORIDE 2 MILLIGRAM(S): 2 INJECTION INTRAMUSCULAR; INTRAVENOUS; SUBCUTANEOUS at 22:22

## 2023-06-23 RX ADMIN — ENOXAPARIN SODIUM 40 MILLIGRAM(S): 100 INJECTION SUBCUTANEOUS at 00:28

## 2023-06-23 RX ADMIN — HYDROMORPHONE HYDROCHLORIDE 2 MILLIGRAM(S): 2 INJECTION INTRAMUSCULAR; INTRAVENOUS; SUBCUTANEOUS at 22:07

## 2023-06-23 NOTE — PROGRESS NOTE ADULT - ASSESSMENT
28y/o M with MHx of hidradenitis suppurativa (not on treatment currently), asthma, genital HSV 2  a/w penile edema and extensive genital rash due to severe HSV flare.

## 2023-06-23 NOTE — PROGRESS NOTE ADULT - SUBJECTIVE AND OBJECTIVE BOX
[facility] Medicine  Dr. Sushma Foster  Pager [phone]    Patient is a 27y old  Male who presents with a chief complaint of Penile severe pain and rash (2023 20:04)    SUBJECTIVE / OVERNIGHT EVENTS: Patient seen and examined. still c/o pain especially during dressing change. Vomiting x2 overnight. No abdominal pain. Had BM last night. Thinks he vomited as he ate too much after many days of no appetite       MEDICATIONS  (STANDING):  acyclovir IVPB      acyclovir IVPB 1000 milliGRAM(s) IV Intermittent every 8 hours  enoxaparin Injectable 40 milliGRAM(s) SubCutaneous every 24 hours  sodium chloride 0.9%. 1000 milliLiter(s) (75 mL/Hr) IV Continuous <Continuous>    MEDICATIONS  (PRN):  acetaminophen     Tablet .. 650 milliGRAM(s) Oral every 6 hours PRN Temp greater or equal to 38C (100.4F), Mild Pain (1 - 3)  aluminum hydroxide/magnesium hydroxide/simethicone Suspension 30 milliLiter(s) Oral every 4 hours PRN Dyspepsia  HYDROmorphone  Injectable 1 milliGRAM(s) IV Push every 4 hours PRN Severe Pain (7 - 10)  melatonin 3 milliGRAM(s) Oral at bedtime PRN Insomnia  ondansetron Injectable 4 milliGRAM(s) IV Push every 8 hours PRN Nausea and/or Vomiting    Vital Signs Last 24 Hrs  T(C): 36.8 (2023 06:18), Max: 36.9 (2023 20:30)  T(F): 98.2 (2023 06:18), Max: 98.4 (2023 20:30)  HR: 82 (2023 06:18) (62 - 82)  BP: 128/68 (2023 06:18) (118/67 - 143/82)  BP(mean): --  RR: 18 (2023 06:18) (18 - 19)  SpO2: 100% (2023 06:18) (100% - 100%)    Parameters below as of 2023 06:18  Patient On (Oxygen Delivery Method): room air      PHYSICAL EXAM:  GENERAL: NAD, well-developed  HEAD:  Atraumatic, Normocephalic  EYES: EOMI, PERRLA, conjunctiva and sclera clear  NECK: Supple, No JVD  CHEST/LUNG: Clear to auscultation bilaterally; No wheeze  HEART: Regular rate and rhythm; No murmurs, rubs, or gallops  ABDOMEN: Soft, Nontender, Nondistended; Bowel sounds present  EXTREMITIES:  2+ Peripheral Pulses, No clubbing, cyanosis, or edema   - ulceration noted around the penile base  PSYCH: AAOx3  NEUROLOGY: non-focal      LABS:                                   12.0   6.79  )-----------( 215      ( 2023 06:23 )             36.3   -23    139  |  103  |  6<L>  ----------------------------<  87  3.7   |  24  |  0.83    Ca    8.6      2023 06:23  Phos  4.2       Mg     1.90             Urinalysis Basic - ( 2023 14:54 )    Color: Yellow / Appearance: Clear / S.030 / pH: x  Gluc: x / Ketone: Negative  / Bili: Negative / Urobili: <2 mg/dL   Blood: x / Protein: 30 mg/dL / Nitrite: Negative   Leuk Esterase: Negative / RBC: 3 /HPF / WBC 1 /HPF   Sq Epi: x / Non Sq Epi: x / Bacteria: Negative        RADIOLOGY & ADDITIONAL TESTS:    Imaging Personally Reviewed:    Consultant(s) Notes Reviewed:  ID, URO, derm    Care Discussed with Consultants/Other Providers:    Assessment and Plan:

## 2023-06-24 LAB
ANION GAP SERPL CALC-SCNC: 14 MMOL/L — SIGNIFICANT CHANGE UP (ref 7–14)
BUN SERPL-MCNC: 9 MG/DL — SIGNIFICANT CHANGE UP (ref 7–23)
CALCIUM SERPL-MCNC: 9.3 MG/DL — SIGNIFICANT CHANGE UP (ref 8.4–10.5)
CHLORIDE SERPL-SCNC: 104 MMOL/L — SIGNIFICANT CHANGE UP (ref 98–107)
CO2 SERPL-SCNC: 22 MMOL/L — SIGNIFICANT CHANGE UP (ref 22–31)
CREAT SERPL-MCNC: 0.88 MG/DL — SIGNIFICANT CHANGE UP (ref 0.5–1.3)
EGFR: 122 ML/MIN/1.73M2 — SIGNIFICANT CHANGE UP
GLUCOSE SERPL-MCNC: 92 MG/DL — SIGNIFICANT CHANGE UP (ref 70–99)
HCT VFR BLD CALC: 39.3 % — SIGNIFICANT CHANGE UP (ref 39–50)
HGB BLD-MCNC: 13.3 G/DL — SIGNIFICANT CHANGE UP (ref 13–17)
MAGNESIUM SERPL-MCNC: 2.1 MG/DL — SIGNIFICANT CHANGE UP (ref 1.6–2.6)
MCHC RBC-ENTMCNC: 31.8 PG — SIGNIFICANT CHANGE UP (ref 27–34)
MCHC RBC-ENTMCNC: 33.8 GM/DL — SIGNIFICANT CHANGE UP (ref 32–36)
MCV RBC AUTO: 94 FL — SIGNIFICANT CHANGE UP (ref 80–100)
NRBC # BLD: 0 /100 WBCS — SIGNIFICANT CHANGE UP (ref 0–0)
NRBC # FLD: 0 K/UL — SIGNIFICANT CHANGE UP (ref 0–0)
PHOSPHATE SERPL-MCNC: 5.1 MG/DL — HIGH (ref 2.5–4.5)
PLATELET # BLD AUTO: 252 K/UL — SIGNIFICANT CHANGE UP (ref 150–400)
POTASSIUM SERPL-MCNC: 4 MMOL/L — SIGNIFICANT CHANGE UP (ref 3.5–5.3)
POTASSIUM SERPL-SCNC: 4 MMOL/L — SIGNIFICANT CHANGE UP (ref 3.5–5.3)
RBC # BLD: 4.18 M/UL — LOW (ref 4.2–5.8)
RBC # FLD: 13.4 % — SIGNIFICANT CHANGE UP (ref 10.3–14.5)
SODIUM SERPL-SCNC: 140 MMOL/L — SIGNIFICANT CHANGE UP (ref 135–145)
WBC # BLD: 7.26 K/UL — SIGNIFICANT CHANGE UP (ref 3.8–10.5)
WBC # FLD AUTO: 7.26 K/UL — SIGNIFICANT CHANGE UP (ref 3.8–10.5)

## 2023-06-24 PROCEDURE — 99232 SBSQ HOSP IP/OBS MODERATE 35: CPT

## 2023-06-24 RX ORDER — SODIUM CHLORIDE 9 MG/ML
1000 INJECTION INTRAMUSCULAR; INTRAVENOUS; SUBCUTANEOUS
Refills: 0 | Status: DISCONTINUED | OUTPATIENT
Start: 2023-06-24 | End: 2023-06-26

## 2023-06-24 RX ADMIN — SODIUM CHLORIDE 75 MILLILITER(S): 9 INJECTION INTRAMUSCULAR; INTRAVENOUS; SUBCUTANEOUS at 10:15

## 2023-06-24 RX ADMIN — HYDROMORPHONE HYDROCHLORIDE 2 MILLIGRAM(S): 2 INJECTION INTRAMUSCULAR; INTRAVENOUS; SUBCUTANEOUS at 11:39

## 2023-06-24 RX ADMIN — LIDOCAINE 15 MILLILITER(S): 4 CREAM TOPICAL at 11:44

## 2023-06-24 RX ADMIN — Medication 269 MILLIGRAM(S): at 09:34

## 2023-06-24 RX ADMIN — Medication 50 MILLIGRAM(S): at 06:01

## 2023-06-24 RX ADMIN — HYDROMORPHONE HYDROCHLORIDE 2 MILLIGRAM(S): 2 INJECTION INTRAMUSCULAR; INTRAVENOUS; SUBCUTANEOUS at 11:09

## 2023-06-24 RX ADMIN — Medication 1 APPLICATION(S): at 10:14

## 2023-06-24 RX ADMIN — ENOXAPARIN SODIUM 40 MILLIGRAM(S): 100 INJECTION SUBCUTANEOUS at 23:09

## 2023-06-24 RX ADMIN — Medication 50 MILLIGRAM(S): at 17:08

## 2023-06-24 RX ADMIN — Medication 269 MILLIGRAM(S): at 17:08

## 2023-06-24 RX ADMIN — Medication 269 MILLIGRAM(S): at 01:02

## 2023-06-24 RX ADMIN — ONDANSETRON 4 MILLIGRAM(S): 8 TABLET, FILM COATED ORAL at 23:46

## 2023-06-24 RX ADMIN — ONDANSETRON 4 MILLIGRAM(S): 8 TABLET, FILM COATED ORAL at 09:34

## 2023-06-24 RX ADMIN — Medication 1 APPLICATION(S): at 23:09

## 2023-06-24 RX ADMIN — HYDROMORPHONE HYDROCHLORIDE 2 MILLIGRAM(S): 2 INJECTION INTRAMUSCULAR; INTRAVENOUS; SUBCUTANEOUS at 23:33

## 2023-06-24 NOTE — PROGRESS NOTE ADULT - SUBJECTIVE AND OBJECTIVE BOX
[facility] Medicine  Dr. Sushma Foster  Pager [phone]    Patient is a 27y old  Male who presents with a chief complaint of Penile severe pain and rash (2023 20:04)    SUBJECTIVE / OVERNIGHT EVENTS: Patient seen and examined. Feeling better. No more vomiting. Tolerating PO      MEDICATIONS  (STANDING):  acyclovir IVPB      acyclovir IVPB 1000 milliGRAM(s) IV Intermittent every 8 hours  enoxaparin Injectable 40 milliGRAM(s) SubCutaneous every 24 hours  sodium chloride 0.9%. 1000 milliLiter(s) (75 mL/Hr) IV Continuous <Continuous>    MEDICATIONS  (PRN):  acetaminophen     Tablet .. 650 milliGRAM(s) Oral every 6 hours PRN Temp greater or equal to 38C (100.4F), Mild Pain (1 - 3)  aluminum hydroxide/magnesium hydroxide/simethicone Suspension 30 milliLiter(s) Oral every 4 hours PRN Dyspepsia  HYDROmorphone  Injectable 1 milliGRAM(s) IV Push every 4 hours PRN Severe Pain (7 - 10)  melatonin 3 milliGRAM(s) Oral at bedtime PRN Insomnia  ondansetron Injectable 4 milliGRAM(s) IV Push every 8 hours PRN Nausea and/or Vomiting    Vital Signs Last 24 Hrs  T(C): 36.7 (2023 06:01), Max: 36.8 (2023 12:52)  T(F): 98 (2023 06:01), Max: 98.2 (2023 12:52)  HR: 62 (2023 06:01) (62 - 85)  BP: 113/60 (2023 06:01) (113/60 - 137/74)  BP(mean): --  RR: 18 (2023 06:01) (18 - 19)  SpO2: 100% (2023 06:01) (100% - 100%)    Parameters below as of 2023 06:01  Patient On (Oxygen Delivery Method): room air    PHYSICAL EXAM:  GENERAL: NAD, well-developed  HEAD:  Atraumatic, Normocephalic  EYES: EOMI, PERRLA, conjunctiva and sclera clear  NECK: Supple, No JVD  CHEST/LUNG: Clear to auscultation bilaterally; No wheeze  HEART: Regular rate and rhythm; No murmurs, rubs, or gallops  ABDOMEN: Soft, Nontender, Nondistended; Bowel sounds present  EXTREMITIES:  2+ Peripheral Pulses, No clubbing, cyanosis, or edema   - ulceration noted around the penile base  PSYCH: AAOx3  NEUROLOGY: non-focal      LABS:                                   13.3   7.26  )-----------( 252      ( 2023 05:50 )             39.3   06-24    140  |  104  |  9   ----------------------------<  92  4.0   |  22  |  0.88    Ca    9.3      2023 05:50  Phos  5.1     06-24  Mg     2.10     -24          Urinalysis Basic - ( 2023 14:54 )    Color: Yellow / Appearance: Clear / S.030 / pH: x  Gluc: x / Ketone: Negative  / Bili: Negative / Urobili: <2 mg/dL   Blood: x / Protein: 30 mg/dL / Nitrite: Negative   Leuk Esterase: Negative / RBC: 3 /HPF / WBC 1 /HPF   Sq Epi: x / Non Sq Epi: x / Bacteria: Negative        RADIOLOGY & ADDITIONAL TESTS:    Imaging Personally Reviewed:    Consultant(s) Notes Reviewed:  ID, URO, derm    Care Discussed with Consultants/Other Providers:    Assessment and Plan:

## 2023-06-25 LAB
ANION GAP SERPL CALC-SCNC: 11 MMOL/L — SIGNIFICANT CHANGE UP (ref 7–14)
BUN SERPL-MCNC: 7 MG/DL — SIGNIFICANT CHANGE UP (ref 7–23)
CALCIUM SERPL-MCNC: 8.7 MG/DL — SIGNIFICANT CHANGE UP (ref 8.4–10.5)
CHLORIDE SERPL-SCNC: 105 MMOL/L — SIGNIFICANT CHANGE UP (ref 98–107)
CO2 SERPL-SCNC: 23 MMOL/L — SIGNIFICANT CHANGE UP (ref 22–31)
CREAT SERPL-MCNC: 0.83 MG/DL — SIGNIFICANT CHANGE UP (ref 0.5–1.3)
EGFR: 124 ML/MIN/1.73M2 — SIGNIFICANT CHANGE UP
GLUCOSE SERPL-MCNC: 95 MG/DL — SIGNIFICANT CHANGE UP (ref 70–99)
HCT VFR BLD CALC: 36 % — LOW (ref 39–50)
HGB BLD-MCNC: 12 G/DL — LOW (ref 13–17)
MAGNESIUM SERPL-MCNC: 2.4 MG/DL — SIGNIFICANT CHANGE UP (ref 1.6–2.6)
MCHC RBC-ENTMCNC: 30.9 PG — SIGNIFICANT CHANGE UP (ref 27–34)
MCHC RBC-ENTMCNC: 33.3 GM/DL — SIGNIFICANT CHANGE UP (ref 32–36)
MCV RBC AUTO: 92.8 FL — SIGNIFICANT CHANGE UP (ref 80–100)
NRBC # BLD: 0 /100 WBCS — SIGNIFICANT CHANGE UP (ref 0–0)
NRBC # FLD: 0 K/UL — SIGNIFICANT CHANGE UP (ref 0–0)
PHOSPHATE SERPL-MCNC: 4.9 MG/DL — HIGH (ref 2.5–4.5)
PLATELET # BLD AUTO: 236 K/UL — SIGNIFICANT CHANGE UP (ref 150–400)
POTASSIUM SERPL-MCNC: 3.6 MMOL/L — SIGNIFICANT CHANGE UP (ref 3.5–5.3)
POTASSIUM SERPL-SCNC: 3.6 MMOL/L — SIGNIFICANT CHANGE UP (ref 3.5–5.3)
RBC # BLD: 3.88 M/UL — LOW (ref 4.2–5.8)
RBC # FLD: 13.5 % — SIGNIFICANT CHANGE UP (ref 10.3–14.5)
SODIUM SERPL-SCNC: 139 MMOL/L — SIGNIFICANT CHANGE UP (ref 135–145)
WBC # BLD: 6.94 K/UL — SIGNIFICANT CHANGE UP (ref 3.8–10.5)
WBC # FLD AUTO: 6.94 K/UL — SIGNIFICANT CHANGE UP (ref 3.8–10.5)

## 2023-06-25 PROCEDURE — 99232 SBSQ HOSP IP/OBS MODERATE 35: CPT

## 2023-06-25 RX ADMIN — Medication 269 MILLIGRAM(S): at 17:22

## 2023-06-25 RX ADMIN — HYDROMORPHONE HYDROCHLORIDE 2 MILLIGRAM(S): 2 INJECTION INTRAMUSCULAR; INTRAVENOUS; SUBCUTANEOUS at 23:30

## 2023-06-25 RX ADMIN — HYDROMORPHONE HYDROCHLORIDE 2 MILLIGRAM(S): 2 INJECTION INTRAMUSCULAR; INTRAVENOUS; SUBCUTANEOUS at 01:10

## 2023-06-25 RX ADMIN — SODIUM CHLORIDE 75 MILLILITER(S): 9 INJECTION INTRAMUSCULAR; INTRAVENOUS; SUBCUTANEOUS at 00:09

## 2023-06-25 RX ADMIN — HYDROMORPHONE HYDROCHLORIDE 2 MILLIGRAM(S): 2 INJECTION INTRAMUSCULAR; INTRAVENOUS; SUBCUTANEOUS at 10:33

## 2023-06-25 RX ADMIN — Medication 1 APPLICATION(S): at 09:23

## 2023-06-25 RX ADMIN — Medication 269 MILLIGRAM(S): at 09:23

## 2023-06-25 RX ADMIN — HYDROMORPHONE HYDROCHLORIDE 2 MILLIGRAM(S): 2 INJECTION INTRAMUSCULAR; INTRAVENOUS; SUBCUTANEOUS at 11:00

## 2023-06-25 RX ADMIN — ONDANSETRON 4 MILLIGRAM(S): 8 TABLET, FILM COATED ORAL at 23:15

## 2023-06-25 RX ADMIN — ONDANSETRON 4 MILLIGRAM(S): 8 TABLET, FILM COATED ORAL at 10:33

## 2023-06-25 RX ADMIN — Medication 50 MILLIGRAM(S): at 06:34

## 2023-06-25 RX ADMIN — ENOXAPARIN SODIUM 40 MILLIGRAM(S): 100 INJECTION SUBCUTANEOUS at 23:28

## 2023-06-25 RX ADMIN — SODIUM CHLORIDE 75 MILLILITER(S): 9 INJECTION INTRAMUSCULAR; INTRAVENOUS; SUBCUTANEOUS at 17:22

## 2023-06-25 RX ADMIN — SODIUM CHLORIDE 75 MILLILITER(S): 9 INJECTION INTRAMUSCULAR; INTRAVENOUS; SUBCUTANEOUS at 20:41

## 2023-06-25 RX ADMIN — SODIUM CHLORIDE 75 MILLILITER(S): 9 INJECTION INTRAMUSCULAR; INTRAVENOUS; SUBCUTANEOUS at 06:34

## 2023-06-25 RX ADMIN — Medication 269 MILLIGRAM(S): at 00:10

## 2023-06-25 RX ADMIN — Medication 1 APPLICATION(S): at 23:14

## 2023-06-25 RX ADMIN — HYDROMORPHONE HYDROCHLORIDE 2 MILLIGRAM(S): 2 INJECTION INTRAMUSCULAR; INTRAVENOUS; SUBCUTANEOUS at 23:15

## 2023-06-25 RX ADMIN — Medication 50 MILLIGRAM(S): at 17:22

## 2023-06-25 NOTE — PROGRESS NOTE ADULT - PROBLEM SELECTOR PLAN 3
DVT prophylaxis: Lovenox sq  diet: regular  dispo: pending clinical course

## 2023-06-25 NOTE — PROGRESS NOTE ADULT - PROBLEM SELECTOR PLAN 2
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   UA negative, HIV negative   Uncircumcised penis with foreskin unable to be retracted behind the coronal margin on exam, ulcerative lesions visualized on the shaft of the penis, scrotum  concomitant phimosis likely secondary to reactive process causing penile edema  urology following, appreciate recs -- phimosis will likely to resolve after HSV flare resolves, patient can consider circumcision for definitive management of phimosis after recent flare subsides  document post void residuals to ensure not retaining

## 2023-06-25 NOTE — PROGRESS NOTE ADULT - REASON FOR ADMISSION
Penile severe pain and rash

## 2023-06-25 NOTE — PROGRESS NOTE ADULT - PROBLEM/PLAN-2
28-Oct-2018 23:59
DISPLAY PLAN FREE TEXT

## 2023-06-25 NOTE — PROGRESS NOTE ADULT - PROBLEM SELECTOR PLAN 1
h/o genital HSV 2 (diagnosed in December 2022, was on suppressive Valtrex), who presented to the ED with concern of severe acute genital HSV flare.   penile/groin pain for the last week or so with associated ulcerative lesions on the shaft and base of his penis. + pelvic LAD on exam, has been taking Valtrex 2g/day since lesions/pain started but no effect.   UA negative. HIV negative.   Dilaudid PRN, lyrica for pain control    Culture +HSV  ID following, appreciate recs- C/w IV Acyclovir Q8h until 6/26  Dermatology evaluation appreciated. C/w current management   c/w symptomatic management/ pain control/ adequate fluid hydration while on acyclovir to avoid crystal nephropathy  Discuss suppressive therapy on discharge with ID s/p completion of treatment dose
h/o genital HSV 2 (diagnosed in December 2022, was on suppressive Valtrex), who presented to the ED with concern of severe acute genital HSV flare.   penile/groin pain for the last week or so with associated ulcerative lesions on the shaft and base of his penis. + pelvic LAD on exam, has been taking Valtrex 2g/day since lesions/pain started but no effect.   UA negative. HIV negative.   Dilaudid PRN, lyrica for pain control    Culture +HSV  ID following, appreciate recs- C/w IV Acyclovir Q8h   Dermatology evaluation appreciated. C/w current management   c/w symptomatic management/ pain control/ adequate fluid hydration while on acyclovir to avoid crystal nephropathy
h/o genital HSV 2 (diagnosed in December 2022, was on suppressive Valtrex), who presented to the ED with concern of severe acute genital HSV flare.   penile/groin pain for the last week or so with associated ulcerative lesions on the shaft and base of his penis. + pelvic LAD on exam, has been taking Valtrex 2g/day since lesions/pain started but no effect.   UA negative. HIV negative.   Dilaudid PRN, lyrica for pain control    Culture +HSV  ID following, appreciate recs- C/w IV Acyclovir Q8h until 6/26  Dermatology evaluation appreciated. C/w current management   c/w symptomatic management/ pain control/ adequate fluid hydration while on acyclovir to avoid crystal nephropathy  Discuss suppressive therapy on discharge with ID s/p completion of treatment dose
h/o genital HSV 2 (diagnosed in December 2022, was on suppressive Valtrex), who presented to the ED with concern of severe acute genital HSV flare.   penile/groin pain for the last week or so with associated ulcerative lesions on the shaft and base of his penis. + pelvic LAD on exam, has been taking Valtrex 2g/day since lesions/pain started but no effect.   UA negative. HIV negative.   Dilaudid PRN, Toradol and morphine.    High suspicion for HSV vs H ducreyi vs LGV given +LAD on exam   ID following, appreciate recs- Start Empiric IV Acyclovir Q8h pending HSV/VZV swab   Dermatology consult - pending  c/w symptomatic management/ pain control/ adequate fluid hydration while on acyclovir to avoid crystal nephropathy
h/o genital HSV 2 (diagnosed in December 2022, was on suppressive Valtrex), who presented to the ED with concern of severe acute genital HSV flare.   penile/groin pain for the last week or so with associated ulcerative lesions on the shaft and base of his penis. + pelvic LAD on exam, has been taking Valtrex 2g/day since lesions/pain started but no effect.   UA negative. HIV negative.   Dilaudid PRN, lyrica for pain control    Culture +HSV  ID following, appreciate recs- C/w IV Acyclovir Q8h   Dermatology evaluation appreciated. C/w current management   c/w symptomatic management/ pain control/ adequate fluid hydration while on acyclovir to avoid crystal nephropathy
h/o genital HSV 2 (diagnosed in December 2022, was on suppressive Valtrex), who presented to the ED with concern of severe acute genital HSV flare.   penile/groin pain for the last week or so with associated ulcerative lesions on the shaft and base of his penis. + pelvic LAD on exam, has been taking Valtrex 2g/day since lesions/pain started but no effect.   UA negative. HIV negative.   Dilaudid PRN, lyrica for pain control    Culture +HSV  ID following, appreciate recs- C/w IV Acyclovir Q8h until 6/26  Dermatology evaluation appreciated. C/w current management   c/w symptomatic management/ pain control/ adequate fluid hydration while on acyclovir to avoid crystal nephropathy  Discuss suppressive therapy with ID s/p completion of treatment dose

## 2023-06-25 NOTE — PROGRESS NOTE ADULT - SUBJECTIVE AND OBJECTIVE BOX
Hospitalist Progress Note  Nelli Richards MD Pager # [phone]    OVERNIGHT EVENTS: VAUGHN    SUBJECTIVE / INTERVAL HPI: Patient seen and examined at bedside. Patient reports that he continues to feel pain over the shaft of his penis but it has improved since coming to the hospital. There were lesions on his scrotum but he says they have resolved.     VITAL SIGNS:  Vital Signs Last 24 Hrs  T(C): 36.7 (25 Jun 2023 06:43), Max: 36.9 (24 Jun 2023 23:06)  T(F): 98 (25 Jun 2023 06:43), Max: 98.5 (24 Jun 2023 23:06)  HR: 62 (25 Jun 2023 06:43) (62 - 86)  BP: 105/67 (25 Jun 2023 06:43) (105/67 - 129/67)  BP(mean): --  RR: 16 (25 Jun 2023 06:43) (16 - 17)  SpO2: 100% (25 Jun 2023 06:43) (100% - 100%)    Parameters below as of 25 Jun 2023 06:43  Patient On (Oxygen Delivery Method): room air        PHYSICAL EXAM:    General: Comfortable appearing male resting in bed   HEENT: NC/AT; PERRL, anicteric sclera; MMM  Neck: supple  Cardiovascular: +S1/S2; RRR  Respiratory: CTA B/L; no W/R/R  Gastrointestinal: soft, NT/ND; +BSx4  Extremities: WWP; no edema, clubbing or cyanosis  Vascular: 2+ radial, DP/PT pulses B/L  Neurological: AAOx3; no focal deficits  : Open ulceration - circumferential along the base of the shaft - non purulent - appears to be healing. Examined with chaperone RN     MEDICATIONS:  MEDICATIONS  (STANDING):  acyclovir IVPB 950 milliGRAM(s) IV Intermittent every 8 hours  clotrimazole 1% Cream 1 Application(s) Topical two times a day  enoxaparin Injectable 40 milliGRAM(s) SubCutaneous every 24 hours  pregabalin 50 milliGRAM(s) Oral two times a day  sodium chloride 0.9%. 1000 milliLiter(s) (75 mL/Hr) IV Continuous <Continuous>    MEDICATIONS  (PRN):  acetaminophen     Tablet .. 650 milliGRAM(s) Oral every 6 hours PRN Temp greater or equal to 38C (100.4F), Mild Pain (1 - 3)  aluminum hydroxide/magnesium hydroxide/simethicone Suspension 30 milliLiter(s) Oral every 4 hours PRN Dyspepsia  benzocaine 20%/menthol 0.5% Spray 4 Spray(s) Topical every 4 hours PRN severe pain  HYDROmorphone  Injectable 2 milliGRAM(s) IV Push every 4 hours PRN Severe Pain (7 - 10)  lidocaine 2% Viscous 15 milliLiter(s) Mucosal four times a day PRN severe pain  melatonin 3 milliGRAM(s) Oral at bedtime PRN Insomnia  ondansetron Injectable 4 milliGRAM(s) IV Push every 8 hours PRN Nausea and/or Vomiting      ALLERGIES:  Allergies    predniSONE (Swelling)    Intolerances        LABS:                        12.0   6.94  )-----------( 236      ( 25 Jun 2023 05:05 )             36.0     06-25    139  |  105  |  7   ----------------------------<  95  3.6   |  23  |  0.83    Ca    8.7      25 Jun 2023 05:05  Phos  4.9     06-25  Mg     2.40     06-25        Urinalysis Basic - ( 25 Jun 2023 05:05 )    Color: x / Appearance: x / SG: x / pH: x  Gluc: 95 mg/dL / Ketone: x  / Bili: x / Urobili: x   Blood: x / Protein: x / Nitrite: x   Leuk Esterase: x / RBC: x / WBC x   Sq Epi: x / Non Sq Epi: x / Bacteria: x      CAPILLARY BLOOD GLUCOSE          RADIOLOGY & ADDITIONAL TESTS: Reviewed.    ASSESSMENT:    PLAN:  Hospitalist Progress Note  Nelli Richards MD Pager # [phone]    OVERNIGHT EVENTS: VAUGHN    SUBJECTIVE / INTERVAL HPI: Patient seen and examined at bedside. Patient reports that he continues to feel pain over the shaft of his penis but it has improved since coming to the hospital. There were lesions on his scrotum but he says they have resolved.     VITAL SIGNS:  Vital Signs Last 24 Hrs  T(C): 36.7 (25 Jun 2023 06:43), Max: 36.9 (24 Jun 2023 23:06)  T(F): 98 (25 Jun 2023 06:43), Max: 98.5 (24 Jun 2023 23:06)  HR: 62 (25 Jun 2023 06:43) (62 - 86)  BP: 105/67 (25 Jun 2023 06:43) (105/67 - 129/67)  BP(mean): --  RR: 16 (25 Jun 2023 06:43) (16 - 17)  SpO2: 100% (25 Jun 2023 06:43) (100% - 100%)    Parameters below as of 25 Jun 2023 06:43  Patient On (Oxygen Delivery Method): room air        PHYSICAL EXAM:    General: Comfortable appearing male resting in bed   HEENT: NC/AT; PERRL, anicteric sclera; MMM  Neck: supple  Cardiovascular: +S1/S2; RRR  Respiratory: CTA B/L; no W/R/R  Gastrointestinal: soft, NT/ND; +BSx4  Extremities: WWP; no edema, clubbing or cyanosis  Vascular: 2+ radial, DP/PT pulses B/L  Neurological: AAOx3; no focal deficits  : Open ulceration - circumferential along the base of the shaft - non purulent - appears to be healing. Examined with chaperone RN Leena     MEDICATIONS:  MEDICATIONS  (STANDING):  acyclovir IVPB 950 milliGRAM(s) IV Intermittent every 8 hours  clotrimazole 1% Cream 1 Application(s) Topical two times a day  enoxaparin Injectable 40 milliGRAM(s) SubCutaneous every 24 hours  pregabalin 50 milliGRAM(s) Oral two times a day  sodium chloride 0.9%. 1000 milliLiter(s) (75 mL/Hr) IV Continuous <Continuous>    MEDICATIONS  (PRN):  acetaminophen     Tablet .. 650 milliGRAM(s) Oral every 6 hours PRN Temp greater or equal to 38C (100.4F), Mild Pain (1 - 3)  aluminum hydroxide/magnesium hydroxide/simethicone Suspension 30 milliLiter(s) Oral every 4 hours PRN Dyspepsia  benzocaine 20%/menthol 0.5% Spray 4 Spray(s) Topical every 4 hours PRN severe pain  HYDROmorphone  Injectable 2 milliGRAM(s) IV Push every 4 hours PRN Severe Pain (7 - 10)  lidocaine 2% Viscous 15 milliLiter(s) Mucosal four times a day PRN severe pain  melatonin 3 milliGRAM(s) Oral at bedtime PRN Insomnia  ondansetron Injectable 4 milliGRAM(s) IV Push every 8 hours PRN Nausea and/or Vomiting      ALLERGIES:  Allergies    predniSONE (Swelling)    Intolerances        LABS:                        12.0   6.94  )-----------( 236      ( 25 Jun 2023 05:05 )             36.0     06-25    139  |  105  |  7   ----------------------------<  95  3.6   |  23  |  0.83    Ca    8.7      25 Jun 2023 05:05  Phos  4.9     06-25  Mg     2.40     06-25        Urinalysis Basic - ( 25 Jun 2023 05:05 )    Color: x / Appearance: x / SG: x / pH: x  Gluc: 95 mg/dL / Ketone: x  / Bili: x / Urobili: x   Blood: x / Protein: x / Nitrite: x   Leuk Esterase: x / RBC: x / WBC x   Sq Epi: x / Non Sq Epi: x / Bacteria: x      CAPILLARY BLOOD GLUCOSE          RADIOLOGY & ADDITIONAL TESTS: Reviewed.    ASSESSMENT:    PLAN:

## 2023-06-26 VITALS
HEART RATE: 60 BPM | OXYGEN SATURATION: 100 % | SYSTOLIC BLOOD PRESSURE: 129 MMHG | RESPIRATION RATE: 18 BRPM | TEMPERATURE: 98 F | DIASTOLIC BLOOD PRESSURE: 84 MMHG

## 2023-06-26 LAB
ANION GAP SERPL CALC-SCNC: 15 MMOL/L — HIGH (ref 7–14)
BUN SERPL-MCNC: 7 MG/DL — SIGNIFICANT CHANGE UP (ref 7–23)
CALCIUM SERPL-MCNC: 9.2 MG/DL — SIGNIFICANT CHANGE UP (ref 8.4–10.5)
CHLORIDE SERPL-SCNC: 102 MMOL/L — SIGNIFICANT CHANGE UP (ref 98–107)
CO2 SERPL-SCNC: 23 MMOL/L — SIGNIFICANT CHANGE UP (ref 22–31)
CREAT SERPL-MCNC: 0.91 MG/DL — SIGNIFICANT CHANGE UP (ref 0.5–1.3)
EGFR: 119 ML/MIN/1.73M2 — SIGNIFICANT CHANGE UP
GLUCOSE SERPL-MCNC: 101 MG/DL — HIGH (ref 70–99)
HCT VFR BLD CALC: 38.1 % — LOW (ref 39–50)
HGB BLD-MCNC: 12.7 G/DL — LOW (ref 13–17)
MAGNESIUM SERPL-MCNC: 2 MG/DL — SIGNIFICANT CHANGE UP (ref 1.6–2.6)
MCHC RBC-ENTMCNC: 31.7 PG — SIGNIFICANT CHANGE UP (ref 27–34)
MCHC RBC-ENTMCNC: 33.3 GM/DL — SIGNIFICANT CHANGE UP (ref 32–36)
MCV RBC AUTO: 95 FL — SIGNIFICANT CHANGE UP (ref 80–100)
NRBC # BLD: 0 /100 WBCS — SIGNIFICANT CHANGE UP (ref 0–0)
NRBC # FLD: 0 K/UL — SIGNIFICANT CHANGE UP (ref 0–0)
PHOSPHATE SERPL-MCNC: 4 MG/DL — SIGNIFICANT CHANGE UP (ref 2.5–4.5)
PLATELET # BLD AUTO: 240 K/UL — SIGNIFICANT CHANGE UP (ref 150–400)
POTASSIUM SERPL-MCNC: 3.7 MMOL/L — SIGNIFICANT CHANGE UP (ref 3.5–5.3)
POTASSIUM SERPL-SCNC: 3.7 MMOL/L — SIGNIFICANT CHANGE UP (ref 3.5–5.3)
RBC # BLD: 4.01 M/UL — LOW (ref 4.2–5.8)
RBC # FLD: 13.5 % — SIGNIFICANT CHANGE UP (ref 10.3–14.5)
SODIUM SERPL-SCNC: 140 MMOL/L — SIGNIFICANT CHANGE UP (ref 135–145)
WBC # BLD: 6.29 K/UL — SIGNIFICANT CHANGE UP (ref 3.8–10.5)
WBC # FLD AUTO: 6.29 K/UL — SIGNIFICANT CHANGE UP (ref 3.8–10.5)

## 2023-06-26 PROCEDURE — 99239 HOSP IP/OBS DSCHRG MGMT >30: CPT

## 2023-06-26 PROCEDURE — 99232 SBSQ HOSP IP/OBS MODERATE 35: CPT

## 2023-06-26 PROCEDURE — 99223 1ST HOSP IP/OBS HIGH 75: CPT

## 2023-06-26 RX ORDER — VALACYCLOVIR 500 MG/1
1 TABLET, FILM COATED ORAL
Qty: 30 | Refills: 0
Start: 2023-06-26 | End: 2023-07-25

## 2023-06-26 RX ORDER — VALACYCLOVIR 500 MG/1
2 TABLET, FILM COATED ORAL
Refills: 0 | DISCHARGE

## 2023-06-26 RX ORDER — LIDOCAINE 4 G/100G
5 CREAM TOPICAL
Qty: 1 | Refills: 0
Start: 2023-06-26 | End: 2023-07-09

## 2023-06-26 RX ADMIN — ONDANSETRON 4 MILLIGRAM(S): 8 TABLET, FILM COATED ORAL at 15:28

## 2023-06-26 RX ADMIN — Medication 269 MILLIGRAM(S): at 08:56

## 2023-06-26 RX ADMIN — Medication 269 MILLIGRAM(S): at 17:29

## 2023-06-26 RX ADMIN — LIDOCAINE 15 MILLILITER(S): 4 CREAM TOPICAL at 17:28

## 2023-06-26 RX ADMIN — HYDROMORPHONE HYDROCHLORIDE 2 MILLIGRAM(S): 2 INJECTION INTRAMUSCULAR; INTRAVENOUS; SUBCUTANEOUS at 16:44

## 2023-06-26 RX ADMIN — HYDROMORPHONE HYDROCHLORIDE 2 MILLIGRAM(S): 2 INJECTION INTRAMUSCULAR; INTRAVENOUS; SUBCUTANEOUS at 15:28

## 2023-06-26 RX ADMIN — Medication 50 MILLIGRAM(S): at 17:29

## 2023-06-26 RX ADMIN — SODIUM CHLORIDE 75 MILLILITER(S): 9 INJECTION INTRAMUSCULAR; INTRAVENOUS; SUBCUTANEOUS at 15:37

## 2023-06-26 RX ADMIN — Medication 1 APPLICATION(S): at 09:00

## 2023-06-26 RX ADMIN — Medication 50 MILLIGRAM(S): at 06:30

## 2023-06-26 RX ADMIN — Medication 269 MILLIGRAM(S): at 01:27

## 2023-06-26 NOTE — BH CONSULTATION LIAISON ASSESSMENT NOTE - SUMMARY
28yo single, unemployed, male, domiciled with parents with PMHx hidradenitis suppurativa, asthma, genital HSV, hx substance use, daily marijuana use PPHx depression, SIB, 2 prior short-term psyche hospitalizations during college, endorses hx of SA's/SI (last attempt 2005-abused inhaler), hx trauma, endorses episode of psychosis last year, no outpatient follow up, no medication management, who presented to the ED with concern of acute genital HSV flare.      psychiatry consulted at request of patient for depression, ACP reports patient stating to staff he only wants to speak to non- staff.    Patient seen, a&o, calm, pleasant, circumstantial, poor eye contact, endorses self as introvert and a loner and growing up sheltered with no friends except cousins, endorses life-long depression with no desire for medication management feels he handles it well himself, patient denies suicidal ideation, cites protective factors of desire to return to school to study psychiatry and be employed, denies auditory/visual hallucinations. Patient amenable to [facility] outpatient resources.    PLAN:  -defer obs status to primary team-no indication for 1:1 CO  -no standing medication at this time  -no psychiatric contraindications to discharge  -please add to discharge summary outpatient resource below 26yo single, unemployed, male, domiciled with parents with PMHx hidradenitis suppurativa, asthma, genital HSV, hx substance use, daily marijuana use PPHx depression, SIB, 2 prior short-term psyche hospitalizations during college, endorses hx of SA's/SI (last attempt 2005-abused inhaler), hx trauma, endorses episode of psychosis last year, no outpatient follow up, no medication management, who presented to the ED with concern of acute genital HSV flare.      psychiatry consulted at request of patient for depression, ACP reports patient stating to staff he only wants to speak to non- staff.    Patient seen, a&o, calm, pleasant, circumstantial, poor eye contact, endorses self as introvert and a loner and growing up sheltered with no friends except cousins, endorses life-long depression with no desire for medication management feels he handles it well himself, patient denies suicidal ideation, cites protective factors of desire to return to school to study psychiatry and be employed, denies auditory/visual hallucinations. Patient amenable to [facility] outpatient resources.    PLAN:  -defer obs status to primary team-no indication for 1:1 CO  -no standing medication at this time  -no psychiatric contraindications to discharge  -please add to discharge summary outpatient resource below

## 2023-06-26 NOTE — BH CONSULTATION LIAISON ASSESSMENT NOTE - NSBHATTESTCOMMENTATTENDFT_PSY_A_CORE
Chart reviewed. Pt seen with ACP. Pt was calm, cooperative, albeit aloof/odd-related. Cited numerous challenges in life that have made him distrustful, introverted, cynical. Says consult request was originally for depression as he was concerned about some staff members this weekend, but feels better from that respect and is open only to outpt psychotherapy services. Denied SI, HI, AVH, or safety concerns. Agree with above assessment/recs and will sign off, as there is no psych acuity here and he is leaving imminently.

## 2023-06-26 NOTE — DIETITIAN INITIAL EVALUATION ADULT - OTHER INFO
Per chart review, 26y/o M with MHx of hidradenitis suppurativa (not on treatment currently), asthma, genital HSV 2  a/w penile edema and extensive genital rash due to severe HSV flare.     Attempted to visit pt today, pt unavailable at the time of visit. Nutrition information obtained via PCA and comprehensive chart review. As per PCA, who reports pt has a fair appetite in hospital, able to consume 50-75% of his meals. Pt noted on regular diet. No chewing and swallowing difficulties reported. As per PCA pt has been feeling nauseous since this morning. Pt also noted with nausea and vomiting yesterday as per RN flow sheet. Zofran is in use. No BM recorded in RN flow sheet. Pt noted currently on IVF for hydration. RD to remain available for further nutritional interventions as indicated.

## 2023-06-26 NOTE — DISCHARGE NOTE NURSING/CASE MANAGEMENT/SOCIAL WORK - NSDCPEFALRISK_GEN_ALL_CORE
For information on Fall & Injury Prevention, visit: [email or web address] OR  [email or web address] OR  https://www.cdc.gov/steadi/patient.html

## 2023-06-26 NOTE — DIETITIAN INITIAL EVALUATION ADULT - PERTINENT MEDS FT
MEDICATIONS  (STANDING):  acyclovir IVPB 950 milliGRAM(s) IV Intermittent every 8 hours  clotrimazole 1% Cream 1 Application(s) Topical two times a day  enoxaparin Injectable 40 milliGRAM(s) SubCutaneous every 24 hours  pregabalin 50 milliGRAM(s) Oral two times a day  sodium chloride 0.9%. 1000 milliLiter(s) (75 mL/Hr) IV Continuous <Continuous>    MEDICATIONS  (PRN):  acetaminophen     Tablet .. 650 milliGRAM(s) Oral every 6 hours PRN Temp greater or equal to 38C (100.4F), Mild Pain (1 - 3)  aluminum hydroxide/magnesium hydroxide/simethicone Suspension 30 milliLiter(s) Oral every 4 hours PRN Dyspepsia  benzocaine 20%/menthol 0.5% Spray 4 Spray(s) Topical every 4 hours PRN severe pain  lidocaine 2% Viscous 15 milliLiter(s) Mucosal four times a day PRN severe pain  melatonin 3 milliGRAM(s) Oral at bedtime PRN Insomnia  ondansetron Injectable 4 milliGRAM(s) IV Push every 8 hours PRN Nausea and/or Vomiting

## 2023-06-26 NOTE — BH CONSULTATION LIAISON ASSESSMENT NOTE - NSBHCHARTREVIEWVS_PSY_A_CORE FT
Vital Signs Last 24 Hrs  T(C): 36.5 (26 Jun 2023 06:33), Max: 36.9 (25 Jun 2023 15:15)  T(F): 97.7 (26 Jun 2023 06:33), Max: 98.5 (25 Jun 2023 23:06)  HR: 60 (26 Jun 2023 06:33) (60 - 74)  BP: 129/84 (26 Jun 2023 06:33) (124/73 - 136/96)  BP(mean): --  RR: 18 (26 Jun 2023 06:33) (18 - 18)  SpO2: 100% (26 Jun 2023 06:33) (99% - 100%)    Parameters below as of 26 Jun 2023 06:33  Patient On (Oxygen Delivery Method): room air

## 2023-06-26 NOTE — PROGRESS NOTE ADULT - ASSESSMENT
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    Impression  #Penile/scrotal ulcerative lesions with associated LAD  Negative HIV, syphilis, GC/Chlamydia, HIV VL negative,   Positive HSV1 PCR from lesions  Persistent pain today  Derm evaluation on 6/20/23    Recommendations  Continue acyclovir given PCR result  Maintain adequate fluid hydration while on acyclovir to avoid crystal nephropathy  Will plan to treat for a total of 7 days, end date: 6/26/23  After completing Acyclovir, switch to PO Valtrex 1g q 24 suppression (indefinite duration), follow up with me in ID clinic in 2 months (Dr. Hills, [phone])  Persistent pain today - monitor, pain control  Follow fever curve and WBC count  Would have patient follow up with Dermatology for Hidradenitis to determine if role for immunosuppressive    Signing off. Please call with further questions or change in status.    Abhishek Hills MD  Contact on TEAMS messaging from 9am - 5pm  From 5pm-9am, on weekends, or if no response call [phone]

## 2023-06-26 NOTE — DIETITIAN INITIAL EVALUATION ADULT - PERTINENT LABORATORY DATA
06-26    140  |  102  |  7   ----------------------------<  101<H>  3.7   |  23  |  0.91    Ca    9.2      26 Jun 2023 07:25  Phos  4.0     06-26  Mg     2.00     06-26    A1C with Estimated Average Glucose Result: 4.9 % (06-20-23 @ 06:42)

## 2023-06-26 NOTE — BH CONSULTATION LIAISON ASSESSMENT NOTE - NSBHCONSULTFOLLOWAFTERCARE_PSY_A_CORE FT
[facility]  [phone] [place] [id number] [phone] [facility]  [phone] [place] [address]  [phone]    [facility]  [phone]

## 2023-06-26 NOTE — DIETITIAN INITIAL EVALUATION ADULT - PROBLEM SELECTOR PLAN 2
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   UA negative, HIV negative   Uncircumcised penis with foreskin unable to be retracted behind the coronal margin on exam,  ulcerative lesions visualized on the shaft of the penis, scrotum  concomitant phimosis likely secondary to reactive process causing penile edema    Plan:   [] urology following, appreciate recs -- phimosis will likely to resolve after HSV flare resolves, patient can consider circumcision for definitive management of phimosis after recent flare subsides  [] document post void residuals to ensure not retaining  [] f/u GC, chlamydia, syphilis, HIV VL, urine culture  [] f/u HSV viral swab

## 2023-06-26 NOTE — CHART NOTE - NSCHARTNOTEFT_GEN_A_CORE
Medicine NP note     As per clinical pharmacologist recommendations Acyclovir should be given 950 mg not 1000 mg based on  pt's weight, each dose every  8 hours IV .Changing order as per recommendations      Cary Oden NP-C  Department of Medicine- ACP  In House Pager #[phone]
Patient seen and examined. Please see discharge note for full physical examination and discharge details. Patient is stable for discharge today.     Betzaida Mcgill MD  Hospitalist  Pager #: [phone]
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
## 2023-06-26 NOTE — DIETITIAN INITIAL EVALUATION ADULT - PROBLEM SELECTOR PLAN 1
h/o genital HSV 2 (diagnosed in December 2022, was on suppressive Valtrex), who presented to the ED with concern of severe acute genital HSV flare.   penile/groin pain for the last week or so with associated ulcerative lesions on the shaft and base of his penis. + pelvic LAD on exam, has been taking Valtrex 2g/day since lesions/pain started but no effect.    UA negative. HIV negative.   s/p 1 L NS, Dilaudid x 2, Toradol and morphine.    High suspcion for HSV vs H ducreyi vs LGV given +LAD on exam     Plan:  [] ID following, appreciate recs- Start Empiric IV Acyclovir Q8h pending HSV/VZV swab   [] f/u GC, chlamydia, syphilis, HIV VL, urine culture   [] Dermatology consult per ID recs  [] c/w symptomatic management/ pain control/ adequate fluid hydration while on acyclovir to avoid crystal nephropathy

## 2023-06-26 NOTE — BH CONSULTATION LIAISON ASSESSMENT NOTE - RISK ASSESSMENT
Risk Factors: age, gender, unemployed, hx depression with poor compliance  Protective Factors: residential stability, supportive family, help seeking, desire to return to college as well as be employed, denies si/sa, denies ah/vh, no access to firearms

## 2023-06-26 NOTE — BH CONSULTATION LIAISON ASSESSMENT NOTE - NSBHCHARTREVIEWLAB_PSY_A_CORE FT
CBC Full  -  ( 26 Jun 2023 07:25 )  WBC Count : 6.29 K/uL  RBC Count : 4.01 M/uL  Hemoglobin : 12.7 g/dL  Hematocrit : 38.1 %  Platelet Count - Automated : 240 K/uL  Mean Cell Volume : 95.0 fL  Mean Cell Hemoglobin : 31.7 pg  Mean Cell Hemoglobin Concentration : 33.3 gm/dL  Auto Neutrophil # : x  Auto Lymphocyte # : x  Auto Monocyte # : x  Auto Eosinophil # : x  Auto Basophil # : x  Auto Neutrophil % : x  Auto Lymphocyte % : x  Auto Monocyte % : x  Auto Eosinophil % : x  Auto Basophil % : x  06-26    140  |  102  |  7   ----------------------------<  101<H>  3.7   |  23  |  0.91    Ca    9.2      26 Jun 2023 07:25  Phos  4.0     06-26  Mg     2.00     06-26

## 2023-06-26 NOTE — BH CONSULTATION LIAISON ASSESSMENT NOTE - CURRENT MEDICATION
MEDICATIONS  (STANDING):  acyclovir IVPB 950 milliGRAM(s) IV Intermittent every 8 hours  clotrimazole 1% Cream 1 Application(s) Topical two times a day  enoxaparin Injectable 40 milliGRAM(s) SubCutaneous every 24 hours  pregabalin 50 milliGRAM(s) Oral two times a day  sodium chloride 0.9%. 1000 milliLiter(s) (75 mL/Hr) IV Continuous <Continuous>    MEDICATIONS  (PRN):  acetaminophen     Tablet .. 650 milliGRAM(s) Oral every 6 hours PRN Temp greater or equal to 38C (100.4F), Mild Pain (1 - 3)  aluminum hydroxide/magnesium hydroxide/simethicone Suspension 30 milliLiter(s) Oral every 4 hours PRN Dyspepsia  benzocaine 20%/menthol 0.5% Spray 4 Spray(s) Topical every 4 hours PRN severe pain  HYDROmorphone  Injectable 2 milliGRAM(s) IV Push every 4 hours PRN Severe Pain (7 - 10)  lidocaine 2% Viscous 15 milliLiter(s) Mucosal four times a day PRN severe pain  melatonin 3 milliGRAM(s) Oral at bedtime PRN Insomnia  ondansetron Injectable 4 milliGRAM(s) IV Push every 8 hours PRN Nausea and/or Vomiting

## 2023-06-26 NOTE — DISCHARGE NOTE NURSING/CASE MANAGEMENT/SOCIAL WORK - PATIENT PORTAL LINK FT
You can access the FollowMyHealth Patient Portal offered by [facility] by registering at the following website: [email or web address]. By joining [employer]’s FollowMyHealth portal, you will also be able to view your health information using other applications (apps) compatible with our system.

## 2023-06-26 NOTE — PROGRESS NOTE ADULT - PROVIDER SPECIALTY LIST ADULT
Hospitalist
Infectious Disease
Infectious Disease
Hospitalist
Hospitalist
Infectious Disease
Hospitalist

## 2023-06-26 NOTE — BH CONSULTATION LIAISON ASSESSMENT NOTE - SOURCE OF INFORMATION
Vancomycin IV Pharmacy-to-Dose Consultation    Delgado Amador is a 62 y o  male who is currently receiving Vancomycin IV with management by the Pharmacy Consult service  Assessment/Plan:  The patient was reviewed  Renal function is stable, receiving dialysis T,Th and Sat  with no s/s of infusion reactions documented in the chart  Based on today?s assessment, continue current vancomycin (day # 4) dosing of 1000mg iv post-HD, with a plan for trough to be drawn at 0600 on 2/23/19  We will continue to follow the patient?s culture results and clinical progress daily      Phil Miles, Pharmacist Patient

## 2023-06-26 NOTE — BH CONSULTATION LIAISON ASSESSMENT NOTE - NSBHATTESTAPPAMEND_PSY_A_CORE
I have personally seen and examined this patient. I fully participated in the care of this patient. I have made amendments to the documentation where appropriate and otherwise agree with the history, physical exam, and plan as documented by the CJ

## 2023-08-08 NOTE — ED ADULT TRIAGE NOTE - HAVE YOU HAD COVID IN THE LAST 60 DAYS?
"Last Written Prescription Date:  5/14/23  Last Fill Quantity: 2 ml,  # refills: 2   Last office visit provider:  7/12/23     Requested Prescriptions   Pending Prescriptions Disp Refills    TRULICITY 1.5 MG/0.5ML pen [Pharmacy Med Name: Trulicity 1.5 MG/0.5ML Subcutaneous Solution Pen-injector] 4 mL 0     Sig: INJECT 1 & 1/2 (ONE & ONE-HALF) MG SUBCUTANEOUSLY ONCE A WEEK       GLP-1 Agonists Protocol Passed - 8/8/2023 10:41 AM        Passed - HgbA1C in past 3 or 6 months     If HgbA1C is 8 or greater, it needs to be on file within the past 3 months.  If less than 8, must be on file within the past 6 months.     Recent Labs   Lab Test 07/12/23  1142   A1C 6.2*             Passed - Medication is active on med list        Passed - Patient is age 18 or older        Passed - Normal serum creatinine on file in past 12 months     Recent Labs   Lab Test 07/12/23  1142   CR 0.84       Ok to refill medication if creatinine is low          Passed - Recent (6 mo) or future (30 days) visit within the authorizing provider's specialty     Patient had office visit in the last 6 months or has a visit in the next 30 days with authorizing provider.  See \"Patient Info\" tab in inbasket, or \"Choose Columns\" in Meds & Orders section of the refill encounter.                 Yen Gan RN 08/08/23 2:01 PM  " No

## 2024-02-17 NOTE — PATIENT PROFILE ADULT - VISION (WITH CORRECTIVE LENSES IF THE PATIENT USUALLY WEARS THEM):
Normal vision: sees adequately in most situations; can see medication labels, newsprint Audra: RUEL. Under arrest. Hasn't had his Lantus for 2 days. Give Lantus here. Hugh.

## 2024-12-16 NOTE — DISCHARGE NOTE PROVIDER - NSDCCONDITION_GEN_ALL_CORE
Refill request received for Metformin     According to Travon's note on 5/29/2024    continue metformin XR 1000 milligrams b.i.d. (if he prefers, may take all 4 tablets at the same time)      RTC: 3-6 months    Labs  Last BMP completed on 2/9/2024  Glomerular Filtration Rate  >=60 >90     Follow up appointment: 4/3/2025    Writer attempted to contact patient LVM waiting for a call back to confirm medication is needed based on last refill sent     Writer made a second attempt to contact patient LVM instructing patient to completed required labs for medication refill. I informed patient a 30 day refill has been sent over to pharmacy.     30 day Prescription sent   
Stable

## 2025-04-21 NOTE — PATIENT PROFILE ADULT - FUNCTIONAL ASSESSMENT - DAILY ACTIVITY 6.
Visit Information    Have you changed or started any medications since your last visit including any over-the-counter medicines, vitamins, or herbal medicines? no   Are you having any side effects from any of your medications? -  no  Have you stopped taking any of your medications? Is so, why? -  no    Have you seen any other physician or provider since your last visit? No  Have you had any other diagnostic tests since your last visit? No  Have you been seen in the emergency room and/or had an admission to a hospital since we last saw you? No  Have you had your routine dental cleaning in the past 6 months? no    Have you activated your [employer] account? If not, what are your barriers? Yes     Patient Care Team:  Les Walker MD as PCP - General (Family Medicine)  Les Walker MD as PCP - Empaneled Provider    Medical History Review  Past Medical, Family, and Social History reviewed and does contribute to the patient presenting condition    Health Maintenance   Topic Date Due    Diabetic retinal exam  Never done    DTaP/Tdap/Td vaccine (1 - Tdap) Never done    Shingles vaccine (1 of 2) Never done    Respiratory Syncytial Virus (RSV) Pregnant or age 60 yrs+ (1 - Risk 60-74 years 1-dose series) Never done    COVID-19 Vaccine (4 - 2024-25 season) 09/01/2024    Annual Wellness Visit (Medicare Advantage)  01/01/2025    A1C test (Diabetic or Prediabetic)  04/15/2025    Diabetic Alb to Cr ratio (uACR) test  06/19/2025    Lipids  06/19/2025    GFR test (Diabetes, CKD 3-4, OR last GFR 15-59)  06/19/2025    Diabetic foot exam  07/15/2025    Breast cancer screen  07/29/2025    Depression Monitoring  01/15/2026    Colorectal Cancer Screen  01/23/2027    DEXA (modify frequency per FRAX score)  Completed    Flu vaccine  Completed    Pneumococcal 50+ years Vaccine  Completed    Hepatitis C screen  Completed    Hepatitis A vaccine  Aged Out    Hepatitis B vaccine  Aged Out    Hib vaccine  Aged Out    Polio vaccine  Aged Out     4 = No assist / stand by assistance

## 2025-07-24 NOTE — ED PROVIDER NOTE - NS ED MD DISPO DIVISION
Meniscus Tear: Care Instructions  Overview     The meniscus is rubbery tissue in the knee that acts as a shock absorber between the upper and lower leg bones. The meniscus also keeps your knee stable by spreading weight across it. Each knee has two menisci (plural of meniscus). You can tear a meniscus if you plant your foot and twist, or pivot. The meniscus also can wear down as you age, and it can tear from squatting or kneeling.  Some tears may feel better on their own with home care and rehab. For others, your doctor may recommend surgery to repair it or to remove part of the meniscus. Your doctor may want you to see a doctor who specializes in bones and sports injuries.  Follow-up care is a key part of your treatment and safety. Be sure to make and go to all appointments, and call your doctor if you are having problems. It's also a good idea to know your test results and keep a list of the medicines you take.  How can you care for yourself at home?  Rest your knee when possible.  Do not squat or kneel.  Take pain medicines exactly as directed.  If the doctor gave you a prescription medicine for pain, take it as prescribed.  If you are not taking a prescription pain medicine, ask your doctor if you can take an over-the-counter medicine.  Put ice or a cold pack on your knee for 10 to 20 minutes at a time. Try to do this every 1 to 2 hours for the next 3 days (when you are awake) or until the swelling goes down. Put a thin cloth between the ice and your skin.  Prop up the sore leg on a pillow when you ice your knee or any time you sit or lie down during the next 3 days. Try to keep your leg above the level of your heart. This will help reduce swelling.  Follow your doctor's directions for using crutches or a knee brace, if suggested.  Follow your doctor's directions for exercises to keep your knee mobile and your leg muscles strong. Here are a few exercises you can try if your doctor says it is okay.  Quad sets:  ADITYA